# Patient Record
Sex: MALE | Race: WHITE | Employment: OTHER | URBAN - METROPOLITAN AREA
[De-identification: names, ages, dates, MRNs, and addresses within clinical notes are randomized per-mention and may not be internally consistent; named-entity substitution may affect disease eponyms.]

---

## 2020-07-29 RX ORDER — LOSARTAN POTASSIUM AND HYDROCHLOROTHIAZIDE 12.5; 1 MG/1; MG/1
TABLET ORAL
Qty: 90 TAB | Refills: 5 | Status: SHIPPED | OUTPATIENT
Start: 2020-07-29 | End: 2021-10-22

## 2020-07-30 ENCOUNTER — TELEPHONE (OUTPATIENT)
Dept: INTERNAL MEDICINE CLINIC | Age: 65
End: 2020-07-30

## 2020-08-04 ENCOUNTER — TELEPHONE (OUTPATIENT)
Dept: INTERNAL MEDICINE CLINIC | Age: 65
End: 2020-08-04

## 2020-08-04 DIAGNOSIS — N52.9 IMPOTENCE DUE TO ERECTILE DYSFUNCTION: Primary | ICD-10-CM

## 2020-08-04 RX ORDER — SILDENAFIL 100 MG/1
TABLET, FILM COATED ORAL
Qty: 6 TAB | Refills: 5 | Status: SHIPPED | OUTPATIENT
Start: 2020-08-04 | End: 2021-12-17 | Stop reason: SDUPTHER

## 2020-10-27 RX ORDER — METOPROLOL SUCCINATE 50 MG/1
50 TABLET, EXTENDED RELEASE ORAL DAILY
Qty: 90 TAB | Refills: 3 | Status: SHIPPED | OUTPATIENT
Start: 2020-10-27 | End: 2021-01-25

## 2020-10-27 RX ORDER — SIMVASTATIN 40 MG/1
40 TABLET, FILM COATED ORAL
Qty: 90 TAB | Refills: 3 | Status: SHIPPED | OUTPATIENT
Start: 2020-10-27 | End: 2021-10-27 | Stop reason: SDUPTHER

## 2021-10-22 RX ORDER — LOSARTAN POTASSIUM AND HYDROCHLOROTHIAZIDE 12.5; 1 MG/1; MG/1
TABLET ORAL
Qty: 90 TABLET | Refills: 3 | Status: SHIPPED | OUTPATIENT
Start: 2021-10-22 | End: 2021-12-17 | Stop reason: SDUPTHER

## 2021-10-22 RX ORDER — METOPROLOL SUCCINATE 50 MG/1
TABLET, EXTENDED RELEASE ORAL
Qty: 90 TABLET | Refills: 3 | Status: SHIPPED | OUTPATIENT
Start: 2021-10-22 | End: 2021-12-17 | Stop reason: SDUPTHER

## 2021-10-27 RX ORDER — SIMVASTATIN 40 MG/1
40 TABLET, FILM COATED ORAL
Qty: 90 TABLET | Refills: 3 | Status: SHIPPED | OUTPATIENT
Start: 2021-10-27 | End: 2021-12-17 | Stop reason: SDUPTHER

## 2021-12-16 ENCOUNTER — OFFICE VISIT (OUTPATIENT)
Dept: INTERNAL MEDICINE CLINIC | Age: 66
End: 2021-12-16
Payer: MEDICARE

## 2021-12-16 VITALS — WEIGHT: 252 LBS | DIASTOLIC BLOOD PRESSURE: 80 MMHG | SYSTOLIC BLOOD PRESSURE: 148 MMHG | BODY MASS INDEX: 32.35 KG/M2

## 2021-12-16 DIAGNOSIS — I10 ESSENTIAL HYPERTENSION: ICD-10-CM

## 2021-12-16 DIAGNOSIS — I10 ESSENTIAL HYPERTENSION: Primary | ICD-10-CM

## 2021-12-16 DIAGNOSIS — E78.2 MIXED HYPERLIPIDEMIA: ICD-10-CM

## 2021-12-16 PROCEDURE — 1101F PT FALLS ASSESS-DOCD LE1/YR: CPT | Performed by: INTERNAL MEDICINE

## 2021-12-16 PROCEDURE — G8427 DOCREV CUR MEDS BY ELIG CLIN: HCPCS | Performed by: INTERNAL MEDICINE

## 2021-12-16 PROCEDURE — 99213 OFFICE O/P EST LOW 20 MIN: CPT | Performed by: INTERNAL MEDICINE

## 2021-12-16 PROCEDURE — G8417 CALC BMI ABV UP PARAM F/U: HCPCS | Performed by: INTERNAL MEDICINE

## 2021-12-16 PROCEDURE — G8510 SCR DEP NEG, NO PLAN REQD: HCPCS | Performed by: INTERNAL MEDICINE

## 2021-12-16 PROCEDURE — G8536 NO DOC ELDER MAL SCRN: HCPCS | Performed by: INTERNAL MEDICINE

## 2021-12-16 PROCEDURE — 3017F COLORECTAL CA SCREEN DOC REV: CPT | Performed by: INTERNAL MEDICINE

## 2021-12-16 NOTE — PROGRESS NOTES
Erika Garcia is a 77 y.o. male presenting for tension and hyperlipidemia          Chief Complaint   Patient presents with    Hypertension        HPI comes in today for follow-up of his hypertension and hyperlipidemia. He feels well. He is staying active. He is not exercising on a regular basis but he is physically active at work. He is having no trouble with his medications. Is been a while since he had lab work done and I suggested he should probably have that done at least once a year. Otherwise he is generally feeling well    Past Medical History:   Diagnosis Date    High blood pressure         Current Outpatient Medications on File Prior to Visit   Medication Sig Dispense Refill    simvastatin (ZOCOR) 40 mg tablet Take 1 Tablet by mouth nightly. 90 Tablet 3    metoprolol succinate (TOPROL-XL) 50 mg XL tablet TAKE 1 TABLET BY MOUTH DAILY 90 Tablet 3    losartan-hydroCHLOROthiazide (HYZAAR) 100-12.5 mg per tablet TAKE 1 TABLET BY MOUTH ONCE DAILY 90 Tablet 3    sildenafil citrate (VIAGRA) 100 mg tablet 0.5 or 1 daily as needed  Indications: the inability to have an erection 6 Tab 5    ketorolac (TORADOL) 10 mg tablet Take 1 Tab by mouth every six (6) hours as needed for Pain. 20 Tab 0     No current facility-administered medications on file prior to visit. ROS systems are reviewed and negative  Visit Vitals  BP (!) 148/80 (BP 1 Location: Right arm, BP Patient Position: Sitting, BP Cuff Size: Adult)   Wt 252 lb (114.3 kg)   BMI 32.35 kg/m²        Physical Exam well-developed  man alert oriented cooperative no distress normocephalic ENT unremarkable neck no lymphadenopathy lungs clear cardiac rhythm regular no edema    Assessment & Plan checks his blood pressure regularly and he always gets lower than what we are getting today.   Would like to see him get some blood work and he should probably have a recheck in 4 to 6 months      Hannah Myers MD

## 2021-12-17 RX ORDER — LOSARTAN POTASSIUM AND HYDROCHLOROTHIAZIDE 12.5; 1 MG/1; MG/1
1 TABLET ORAL DAILY
Qty: 90 TABLET | Refills: 3 | Status: SHIPPED | OUTPATIENT
Start: 2021-12-17

## 2021-12-17 RX ORDER — METOPROLOL SUCCINATE 50 MG/1
50 TABLET, EXTENDED RELEASE ORAL DAILY
Qty: 90 TABLET | Refills: 3 | Status: SHIPPED | OUTPATIENT
Start: 2021-12-17

## 2021-12-17 RX ORDER — SILDENAFIL 100 MG/1
TABLET, FILM COATED ORAL
Qty: 6 TABLET | Refills: 5 | Status: SHIPPED | OUTPATIENT
Start: 2021-12-17

## 2021-12-17 RX ORDER — SIMVASTATIN 40 MG/1
40 TABLET, FILM COATED ORAL
Qty: 90 TABLET | Refills: 3 | Status: SHIPPED | OUTPATIENT
Start: 2021-12-17

## 2021-12-17 NOTE — TELEPHONE ENCOUNTER
Pt called to request refills. He said he was asked yesterday and he didn't think he needed any but after looking when he got home he realized he does. He needed all his medications refilled. He said 1 prescription goes to [facility] and the rest go to [place] [id number].

## 2022-03-18 PROBLEM — N52.9 IMPOTENCE DUE TO ERECTILE DYSFUNCTION: Status: ACTIVE | Noted: 2020-08-04

## 2022-05-17 ENCOUNTER — HOSPITAL ENCOUNTER (INPATIENT)
Age: 67
LOS: 2 days | Discharge: HOME | DRG: 596 | End: 2022-05-21
Attending: EMERGENCY MEDICINE | Admitting: INTERNAL MEDICINE
Payer: MEDICARE

## 2022-05-17 ENCOUNTER — APPOINTMENT (OUTPATIENT)
Dept: CT IMAGING | Age: 67
DRG: 596 | End: 2022-05-17
Attending: EMERGENCY MEDICINE
Payer: MEDICARE

## 2022-05-17 ENCOUNTER — APPOINTMENT (OUTPATIENT)
Dept: GENERAL RADIOLOGY | Age: 67
DRG: 596 | End: 2022-05-17
Attending: EMERGENCY MEDICINE
Payer: MEDICARE

## 2022-05-17 DIAGNOSIS — R51.9 ACUTE NONINTRACTABLE HEADACHE, UNSPECIFIED HEADACHE TYPE: ICD-10-CM

## 2022-05-17 DIAGNOSIS — B02.1 HERPES ZOSTER MENINGITIS: ICD-10-CM

## 2022-05-17 DIAGNOSIS — B02.8 HERPES ZOSTER WITH COMPLICATION: Primary | ICD-10-CM

## 2022-05-17 DIAGNOSIS — R50.9 FEBRILE ILLNESS, ACUTE: ICD-10-CM

## 2022-05-17 DIAGNOSIS — I10 HYPERTENSION, UNSPECIFIED TYPE: ICD-10-CM

## 2022-05-17 PROBLEM — A41.9 SEPSIS (HCC): Status: ACTIVE | Noted: 2022-05-17

## 2022-05-17 PROBLEM — A41.9 SEPSIS (HCC): Status: RESOLVED | Noted: 2022-05-17 | Resolved: 2022-05-17

## 2022-05-17 PROBLEM — B02.9 HERPES ZOSTER: Status: ACTIVE | Noted: 2022-05-17

## 2022-05-17 LAB
ALBUMIN SERPL-MCNC: 3.8 G/DL (ref 3.4–5)
ALBUMIN/GLOB SERPL: 1 {RATIO} (ref 0.8–1.7)
ALP SERPL-CCNC: 72 U/L (ref 45–117)
ALT SERPL-CCNC: 30 U/L (ref 16–61)
ANION GAP SERPL CALC-SCNC: 7 MMOL/L (ref 3–18)
AST SERPL W P-5'-P-CCNC: 18 U/L (ref 10–38)
BASOPHILS # BLD: 0 K/UL (ref 0–0.1)
BASOPHILS NFR BLD: 0 % (ref 0–2)
BILIRUB SERPL-MCNC: 0.4 MG/DL (ref 0.2–1)
BUN SERPL-MCNC: 19 MG/DL (ref 7–18)
BUN/CREAT SERPL: 16 (ref 12–20)
CA-I BLD-MCNC: 8.8 MG/DL (ref 8.5–10.1)
CHLORIDE SERPL-SCNC: 98 MMOL/L (ref 100–111)
CO2 SERPL-SCNC: 29 MMOL/L (ref 21–32)
COVID-19 RAPID TEST, COVR: NOT DETECTED
CREAT SERPL-MCNC: 1.16 MG/DL (ref 0.6–1.3)
DIFFERENTIAL METHOD BLD: ABNORMAL
EOSINOPHIL # BLD: 0 K/UL (ref 0–0.4)
EOSINOPHIL NFR BLD: 0 % (ref 0–5)
ERYTHROCYTE [DISTWIDTH] IN BLOOD BY AUTOMATED COUNT: 11.9 % (ref 11.6–14.5)
FLUAV AG NPH QL IA: NEGATIVE
FLUBV AG NOSE QL IA: NEGATIVE
GLOBULIN SER CALC-MCNC: 3.8 G/DL (ref 2–4)
GLUCOSE SERPL-MCNC: 187 MG/DL (ref 74–99)
HCT VFR BLD AUTO: 38.7 % (ref 36–48)
HGB BLD-MCNC: 13.4 G/DL (ref 13–16)
IMM GRANULOCYTES # BLD AUTO: 0.1 K/UL (ref 0–0.04)
IMM GRANULOCYTES NFR BLD AUTO: 1 % (ref 0–0.5)
INR PPP: 1 (ref 0.8–1.2)
LACTATE SERPL-SCNC: 2.4 MMOL/L (ref 0.4–2)
LACTATE SERPL-SCNC: NORMAL MMOL/L (ref 0.4–2)
LYMPHOCYTES # BLD: 0.9 K/UL (ref 0.9–3.6)
LYMPHOCYTES NFR BLD: 7 % (ref 21–52)
MCH RBC QN AUTO: 34.3 PG (ref 24–34)
MCHC RBC AUTO-ENTMCNC: 34.6 G/DL (ref 31–37)
MCV RBC AUTO: 99 FL (ref 78–100)
MONOCYTES # BLD: 0.3 K/UL (ref 0.05–1.2)
MONOCYTES NFR BLD: 2 % (ref 3–10)
NEUTS SEG # BLD: 11.2 K/UL (ref 1.8–8)
NEUTS SEG NFR BLD: 90 % (ref 40–73)
NRBC # BLD: 0 K/UL (ref 0–0.01)
NRBC BLD-RTO: 0 PER 100 WBC
PLATELET # BLD AUTO: 191 K/UL (ref 135–420)
PMV BLD AUTO: 10.2 FL (ref 9.2–11.8)
POTASSIUM SERPL-SCNC: 4 MMOL/L (ref 3.5–5.5)
PROT SERPL-MCNC: 7.6 G/DL (ref 6.4–8.2)
PROTHROMBIN TIME: 13.1 SEC (ref 11.5–15.2)
RBC # BLD AUTO: 3.91 M/UL (ref 4.35–5.65)
SODIUM SERPL-SCNC: 134 MMOL/L (ref 136–145)
WBC # BLD AUTO: 12.5 K/UL (ref 4.6–13.2)

## 2022-05-17 PROCEDURE — 93005 ELECTROCARDIOGRAM TRACING: CPT

## 2022-05-17 PROCEDURE — G0378 HOSPITAL OBSERVATION PER HR: HCPCS

## 2022-05-17 PROCEDURE — 74011250637 HC RX REV CODE- 250/637: Performed by: EMERGENCY MEDICINE

## 2022-05-17 PROCEDURE — 99285 EMERGENCY DEPT VISIT HI MDM: CPT

## 2022-05-17 PROCEDURE — 96365 THER/PROPH/DIAG IV INF INIT: CPT

## 2022-05-17 PROCEDURE — 85610 PROTHROMBIN TIME: CPT

## 2022-05-17 PROCEDURE — 74011250636 HC RX REV CODE- 250/636: Performed by: EMERGENCY MEDICINE

## 2022-05-17 PROCEDURE — 83605 ASSAY OF LACTIC ACID: CPT

## 2022-05-17 PROCEDURE — 85025 COMPLETE CBC W/AUTO DIFF WBC: CPT

## 2022-05-17 PROCEDURE — 74011000250 HC RX REV CODE- 250: Performed by: NURSE PRACTITIONER

## 2022-05-17 PROCEDURE — 80053 COMPREHEN METABOLIC PANEL: CPT

## 2022-05-17 PROCEDURE — 96375 TX/PRO/DX INJ NEW DRUG ADDON: CPT

## 2022-05-17 PROCEDURE — 81003 URINALYSIS AUTO W/O SCOPE: CPT

## 2022-05-17 PROCEDURE — 87635 SARS-COV-2 COVID-19 AMP PRB: CPT

## 2022-05-17 PROCEDURE — 74011000258 HC RX REV CODE- 258: Performed by: EMERGENCY MEDICINE

## 2022-05-17 PROCEDURE — 71045 X-RAY EXAM CHEST 1 VIEW: CPT

## 2022-05-17 PROCEDURE — 70450 CT HEAD/BRAIN W/O DYE: CPT

## 2022-05-17 PROCEDURE — 87040 BLOOD CULTURE FOR BACTERIA: CPT

## 2022-05-17 PROCEDURE — 87804 INFLUENZA ASSAY W/OPTIC: CPT

## 2022-05-17 RX ORDER — TRAMADOL HYDROCHLORIDE 50 MG/1
TABLET ORAL
COMMUNITY
Start: 2022-05-17

## 2022-05-17 RX ORDER — PREDNISONE 10 MG/1
10 TABLET ORAL DAILY
COMMUNITY
Start: 2022-05-16 | End: 2022-05-21

## 2022-05-17 RX ORDER — SODIUM CHLORIDE 450 MG/100ML
75 INJECTION, SOLUTION INTRAVENOUS CONTINUOUS
Status: DISPENSED | OUTPATIENT
Start: 2022-05-17 | End: 2022-05-18

## 2022-05-17 RX ORDER — ONDANSETRON 4 MG/1
4 TABLET, ORALLY DISINTEGRATING ORAL
Status: DISCONTINUED | OUTPATIENT
Start: 2022-05-17 | End: 2022-05-21 | Stop reason: HOSPADM

## 2022-05-17 RX ORDER — ACETAMINOPHEN 650 MG/1
650 SUPPOSITORY RECTAL
Status: DISCONTINUED | OUTPATIENT
Start: 2022-05-17 | End: 2022-05-21 | Stop reason: HOSPADM

## 2022-05-17 RX ORDER — MORPHINE SULFATE 2 MG/ML
2 INJECTION, SOLUTION INTRAMUSCULAR; INTRAVENOUS
Status: COMPLETED | OUTPATIENT
Start: 2022-05-17 | End: 2022-05-17

## 2022-05-17 RX ORDER — POLYETHYLENE GLYCOL 3350 17 G/17G
17 POWDER, FOR SOLUTION ORAL DAILY PRN
Status: DISCONTINUED | OUTPATIENT
Start: 2022-05-17 | End: 2022-05-21 | Stop reason: HOSPADM

## 2022-05-17 RX ORDER — ACETAMINOPHEN 500 MG
1000 TABLET ORAL
Status: COMPLETED | OUTPATIENT
Start: 2022-05-17 | End: 2022-05-17

## 2022-05-17 RX ORDER — HYDROCODONE BITARTRATE AND ACETAMINOPHEN 5; 325 MG/1; MG/1
1 TABLET ORAL
Status: DISCONTINUED | OUTPATIENT
Start: 2022-05-17 | End: 2022-05-18

## 2022-05-17 RX ORDER — PREDNISONE 10 MG/1
10 TABLET ORAL DAILY
Status: DISCONTINUED | OUTPATIENT
Start: 2022-05-18 | End: 2022-05-18

## 2022-05-17 RX ORDER — ONDANSETRON 2 MG/ML
4 INJECTION INTRAMUSCULAR; INTRAVENOUS
Status: DISCONTINUED | OUTPATIENT
Start: 2022-05-17 | End: 2022-05-21 | Stop reason: HOSPADM

## 2022-05-17 RX ORDER — HYDROMORPHONE HYDROCHLORIDE 1 MG/ML
1 INJECTION, SOLUTION INTRAMUSCULAR; INTRAVENOUS; SUBCUTANEOUS
Status: COMPLETED | OUTPATIENT
Start: 2022-05-17 | End: 2022-05-17

## 2022-05-17 RX ORDER — METOPROLOL SUCCINATE 50 MG/1
50 TABLET, EXTENDED RELEASE ORAL DAILY
Status: DISCONTINUED | OUTPATIENT
Start: 2022-05-18 | End: 2022-05-21 | Stop reason: HOSPADM

## 2022-05-17 RX ORDER — SODIUM CHLORIDE 0.9 % (FLUSH) 0.9 %
5-40 SYRINGE (ML) INJECTION EVERY 8 HOURS
Status: DISCONTINUED | OUTPATIENT
Start: 2022-05-17 | End: 2022-05-21 | Stop reason: HOSPADM

## 2022-05-17 RX ORDER — ATORVASTATIN CALCIUM 10 MG/1
20 TABLET, FILM COATED ORAL DAILY
Status: DISCONTINUED | OUTPATIENT
Start: 2022-05-18 | End: 2022-05-21 | Stop reason: HOSPADM

## 2022-05-17 RX ORDER — ACETAMINOPHEN 325 MG/1
650 TABLET ORAL
Status: DISCONTINUED | OUTPATIENT
Start: 2022-05-17 | End: 2022-05-21 | Stop reason: HOSPADM

## 2022-05-17 RX ORDER — FAMCICLOVIR 500 MG/1
500 TABLET ORAL 3 TIMES DAILY
COMMUNITY
End: 2022-05-21

## 2022-05-17 RX ORDER — SODIUM CHLORIDE 0.9 % (FLUSH) 0.9 %
5-40 SYRINGE (ML) INJECTION AS NEEDED
Status: DISCONTINUED | OUTPATIENT
Start: 2022-05-17 | End: 2022-05-21 | Stop reason: HOSPADM

## 2022-05-17 RX ORDER — TRAMADOL HYDROCHLORIDE 50 MG/1
50 TABLET ORAL
Status: DISCONTINUED | OUTPATIENT
Start: 2022-05-17 | End: 2022-05-18

## 2022-05-17 RX ORDER — ONDANSETRON 2 MG/ML
4 INJECTION INTRAMUSCULAR; INTRAVENOUS
Status: COMPLETED | OUTPATIENT
Start: 2022-05-17 | End: 2022-05-17

## 2022-05-17 RX ADMIN — CEFTRIAXONE 1 G: 1 INJECTION, POWDER, FOR SOLUTION INTRAMUSCULAR; INTRAVENOUS at 20:00

## 2022-05-17 RX ADMIN — SODIUM CHLORIDE, PRESERVATIVE FREE 10 ML: 5 INJECTION INTRAVENOUS at 22:00

## 2022-05-17 RX ADMIN — ACETAMINOPHEN 1000 MG: 500 TABLET ORAL at 18:00

## 2022-05-17 RX ADMIN — ACYCLOVIR SODIUM 500 MG: 50 INJECTION, SOLUTION INTRAVENOUS at 18:30

## 2022-05-17 RX ADMIN — SODIUM CHLORIDE 75 ML/HR: 4.5 INJECTION, SOLUTION INTRAVENOUS at 23:58

## 2022-05-17 RX ADMIN — ONDANSETRON 4 MG: 2 INJECTION INTRAMUSCULAR; INTRAVENOUS at 18:18

## 2022-05-17 RX ADMIN — METHYLPREDNISOLONE SODIUM SUCCINATE 125 MG: 125 INJECTION, POWDER, FOR SOLUTION INTRAMUSCULAR; INTRAVENOUS at 18:00

## 2022-05-17 RX ADMIN — MORPHINE SULFATE 2 MG: 2 INJECTION, SOLUTION INTRAMUSCULAR; INTRAVENOUS at 18:00

## 2022-05-17 RX ADMIN — HYDROMORPHONE HYDROCHLORIDE 1 MG: 1 INJECTION, SOLUTION INTRAMUSCULAR; INTRAVENOUS; SUBCUTANEOUS at 19:56

## 2022-05-17 RX ADMIN — SODIUM CHLORIDE 1000 ML: 9 INJECTION, SOLUTION INTRAVENOUS at 18:00

## 2022-05-17 NOTE — Clinical Note
Patient Class[de-identified] OBSERVATION [104]   Type of Bed: Medical [8]   Cardiac Monitoring Required?: No   Reason for Observation: Sepsis   Admitting Diagnosis: Sepsis [facility]) [[id number]]   Admitting Diagnosis: Herpes zoster [704674]   Admitting Diagnosis: Headache [[id number]]   Admitting Diagnosis: Hypertension [202064]   Admitting Physician: Lindy Peolpes [[id number]]   Attending Physician: Lindy Peoples [[id number]]

## 2022-05-17 NOTE — ED TRIAGE NOTES
Patient arrives ambulatory to the ER with c/o a shingle outbreak that started last Friday. He was started on medications yesterday by his PCP. He reports having a headache that started this morning around 0900 and a temperature of 100.1. He denies neuro deficits. Associated symptoms include muscle soreness. Rash present to right torso.

## 2022-05-17 NOTE — ED PROVIDER NOTES
EMERGENCY DEPARTMENT HISTORY AND PHYSICAL EXAM      Date: 5/17/2022  Patient Name: Mak Wan      History of Presenting Illness     Chief Complaint   Patient presents with    Shingles    Headache    Generalized Body Aches       History Provided By: Patient    HPI: Mak Wan, 74 y.o. male with a past medical history significant hypertension and hyperlipidemia presents to the ED with cc of Fever, headache and rash. Started 4 days ago with a painful itchy rash on right torso anterolaterally. It spread to the back and became more pronounced over 2 days. He started having a headache which became severe in past three days. He has been using Motrin which doesn't help. His PCP prescribed Famciclovir and prednisone yesterday for shingles. He got worse and PCP added  Tramadol today. Patient continued to spike fever associated with his headache and worsening rash so he was sent to the ED by his PCP. Nothing makes it better or worse. He denies photophobia. States the headache is like when he had a stroke for which he was mediflight evacuated. There are no other complaints, changes, or physical findings at this time. PCP: Rocio Rose MD    Current Outpatient Medications   Medication Sig Dispense Refill    acyclovir (ZOVIRAX) 800 mg tablet Take 1 Tablet by mouth three (3) times daily for 11 days. 33 Tablet 0    traMADoL (ULTRAM) 50 mg tablet every eight (8) hours as needed for Pain.  simvastatin (ZOCOR) 40 mg tablet Take 1 Tablet by mouth nightly. 90 Tablet 3    metoprolol succinate (TOPROL-XL) 50 mg XL tablet Take 1 Tablet by mouth daily. 90 Tablet 3    losartan-hydroCHLOROthiazide (HYZAAR) 100-12.5 mg per tablet Take 1 Tablet by mouth daily. 90 Tablet 3    ketorolac (TORADOL) 10 mg tablet Take 1 Tab by mouth every six (6) hours as needed for Pain.  20 Tab 0    sildenafil citrate (VIAGRA) 100 mg tablet 0.5 or 1 daily as needed  Indications: the inability to have an erection 6 Tablet 5 Past History     Past Medical History:  Past Medical History:   Diagnosis Date    High blood pressure        Past Surgical History:  Past Surgical History:   Procedure Laterality Date    HX APPENDECTOMY      age 7    HX HEENT Bilateral     Lens Implant    HX REFRACTIVE SURGERY         Family History:  History reviewed. No pertinent family history. Social History:  Social History     Tobacco Use    Smoking status: Never Smoker    Smokeless tobacco: Never Used   Vaping Use    Vaping Use: Never used   Substance Use Topics    Alcohol use: Never     Comment: occasional    Drug use: Never       Allergies:  No Known Allergies      Review of Systems     Review of Systems   Constitutional: Positive for chills and fatigue. HENT: Negative. Respiratory: Negative. Cardiovascular: Negative. Gastrointestinal: Negative. Genitourinary: Negative. Musculoskeletal: Negative. Skin: Positive for rash. Neurological: Positive for headaches. All other systems reviewed and are negative. Physical Exam     Physical Exam  Vitals and nursing note reviewed. Constitutional:       General: He is in acute distress. Appearance: Normal appearance. He is ill-appearing. HENT:      Head: Normocephalic and atraumatic. Nose: Nose normal.      Mouth/Throat:      Mouth: Mucous membranes are dry. Eyes:      Pupils: Pupils are equal, round, and reactive to light. Cardiovascular:      Rate and Rhythm: Normal rate and regular rhythm. Pulses: Normal pulses. Pulmonary:      Effort: Pulmonary effort is normal.      Breath sounds: Normal breath sounds. Abdominal:      General: Abdomen is flat. Palpations: Abdomen is soft. Musculoskeletal:         General: Normal range of motion. Cervical back: Normal range of motion and neck supple. No tenderness. Skin:     Findings: Erythema, lesion and rash present.       Comments: Extensive vesiculopapular, erythematous rash involving dermatomes right anterolateral and flank of torso. Neurological:      General: No focal deficit present. Mental Status: He is alert and oriented to person, place, and time. Cranial Nerves: No cranial nerve deficit. Sensory: No sensory deficit. Motor: No weakness. Coordination: Coordination normal.         Lab and Diagnostic Study Results     Labs -     No results found for this or any previous visit (from the past 12 hour(s)). Radiologic Studies -   [unfilled]  CT Results  (Last 48 hours)    None        CXR Results  (Last 48 hours)    None          Medical Decision Making and ED Course   - I am the first and primary provider for this patient AND AM THE PRIMARY PROVIDER OF RECORD. - I reviewed the vital signs, available nursing notes, past medical history, past surgical history, family history and social history. - Initial assessment performed. The patients presenting problems have been discussed, and the staff are in agreement with the care plan formulated and outlined with them. I have encouraged them to ask questions as they arise throughout their visit. Vital Signs-Reviewed the patient's vital signs. No data found. EKG interpretation: (Preliminary): Performed at [place] [address], and read at [address] [place] [address] Blvd: normal sinus rhythm; and regular . Rate (approx.): 85; Axis: normal; KS interval: normal; QRS interval: normal ; ST/T wave: non-specific changes; Other findings: borderline ekg.       Records Reviewed: Nursing Notes    The patient presents with     ED Course:              Provider Notes (Medical Decision Making):     MDM  Number of Diagnoses or Management Options     Amount and/or Complexity of Data Reviewed  Clinical lab tests: ordered and reviewed  Tests in the radiology section of CPT®: ordered and reviewed    Risk of Complications, Morbidity, and/or Mortality  Presenting problems: high  Diagnostic procedures: high  Management options: high  General comments: Patient declined LP.   7:37p Patient refused LP and also became somnolent after pain medication with drop in O2 sat. Will give IV antibiotic, Acyclovir and also start on O2. 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 I am unable to convince him for an LP even with the wife present. Consultations:       Consultations:         Procedures and Critical Care       Performed by: Yany Bhagat MD  PROCEDURES:  Procedures         HYPERTENSION COUNSELING: Education was provided to the patient today regarding their hypertension. Patient is made aware of their elevated blood pressure and is instructed to follow up this week with their Primary Care for a recheck. Patient is counseled regarding consequences of chronic, uncontrolled hypertension including kidney disease, heart disease, stroke or even death. Patient states their understanding and agrees to follow up this week. Additionally, during their visit, I discussed sodium restriction, maintaining ideal body weight and regular exercise program as physiologic means to achieve blood pressure control. The patient will strive towards this. Yany Bhagat MD        Disposition     Disposition: Admitted to Observation Unit the case was discussed with the admitting physician Mariah/ Brandt Guo MD    Admitted    Remove if not discharged  DISCHARGE PLAN:  1. Current Discharge Medication List      CONTINUE these medications which have NOT CHANGED    Details   predniSONE (DELTASONE) 10 mg tablet       traMADoL (ULTRAM) 50 mg tablet       simvastatin (ZOCOR) 40 mg tablet Take 1 Tablet by mouth nightly. Qty: 90 Tablet, Refills: 3      metoprolol succinate (TOPROL-XL) 50 mg XL tablet Take 1 Tablet by mouth daily. Qty: 90 Tablet, Refills: 3      losartan-hydroCHLOROthiazide (HYZAAR) 100-12.5 mg per tablet Take 1 Tablet by mouth daily.   Qty: 90 Tablet, Refills: 3      sildenafil citrate (VIAGRA) 100 mg tablet 0.5 or 1 daily as needed  Indications: the inability to have an erection  Qty: 6 Tablet, Refills: 5      ketorolac (TORADOL) 10 mg tablet Take 1 Tab by mouth every six (6) hours as needed for Pain. Qty: 20 Tab, Refills: 0           2. Follow-up Information     Follow up With Specialties Details Why Contact Info    Gerard Davies MD Internal Medicine Physician Schedule an appointment as soon as possible for a visit in 2 weeks Hospital follow up [facility] [address] [address]  [phone]          3. Return to ED if worse   4. Discharge Medication List as of 5/21/2022  9:05 AM      START taking these medications    Details   acyclovir (ZOVIRAX) 800 mg tablet Take 1 Tablet by mouth three (3) times daily for 11 days. , Normal, Disp-33 Tablet, R-0         CONTINUE these medications which have NOT CHANGED    Details   traMADoL (ULTRAM) 50 mg tablet every eight (8) hours as needed for Pain., Historical Med      simvastatin (ZOCOR) 40 mg tablet Take 1 Tablet by mouth nightly., Normal, Disp-90 Tablet, R-3      metoprolol succinate (TOPROL-XL) 50 mg XL tablet Take 1 Tablet by mouth daily. , Normal, Disp-90 Tablet, R-3      losartan-hydroCHLOROthiazide (HYZAAR) 100-12.5 mg per tablet Take 1 Tablet by mouth daily. , Normal, Disp-90 Tablet, R-3      ketorolac (TORADOL) 10 mg tablet Take 1 Tab by mouth every six (6) hours as needed for Pain., Normal, Disp-20 Tab, R-0      sildenafil citrate (VIAGRA) 100 mg tablet 0.5 or 1 daily as needed  Indications: the inability to have an erection, Normal, Disp-6 Tablet, R-5         STOP taking these medications       predniSONE (DELTASONE) 10 mg tablet Comments:   Reason for Stopping:         famciclovir (FAMVIR) 500 mg tablet Comments:   Reason for Stopping:               Diagnosis     Clinical Impression:   1. Herpes zoster with complication    2. Febrile illness, acute    3. Acute nonintractable headache, unspecified headache type    4. Hypertension, unspecified type    5.  Herpes zoster meningitis Attestations:    Yany Bhagat MD    Please note that this dictation was completed with Namshi, the computer voice recognition software. Quite often unanticipated grammatical, syntax, homophones, and other interpretive errors are inadvertently transcribed by the computer software. Please disregard these errors. Please excuse any errors that have escaped final proofreading. Thank you.

## 2022-05-18 LAB
ANION GAP SERPL CALC-SCNC: 6 MMOL/L (ref 3–18)
APPEARANCE UR: CLEAR
ATRIAL RATE: 85 BPM
BILIRUB UR QL: NEGATIVE
BUN SERPL-MCNC: 19 MG/DL (ref 7–18)
BUN/CREAT SERPL: 19 (ref 12–20)
CA-I BLD-MCNC: 8.3 MG/DL (ref 8.5–10.1)
CALCULATED P AXIS, ECG09: 43 DEGREES
CALCULATED R AXIS, ECG10: 55 DEGREES
CALCULATED T AXIS, ECG11: 29 DEGREES
CHLORIDE SERPL-SCNC: 97 MMOL/L (ref 100–111)
CO2 SERPL-SCNC: 29 MMOL/L (ref 21–32)
COLOR UR: YELLOW
CREAT SERPL-MCNC: 1.02 MG/DL (ref 0.6–1.3)
DIAGNOSIS, 93000: NORMAL
ERYTHROCYTE [DISTWIDTH] IN BLOOD BY AUTOMATED COUNT: 11.9 % (ref 11.6–14.5)
GLUCOSE SERPL-MCNC: 202 MG/DL (ref 74–99)
GLUCOSE UR STRIP.AUTO-MCNC: 100 MG/DL
HCT VFR BLD AUTO: 37.6 % (ref 36–48)
HGB BLD-MCNC: 12.9 G/DL (ref 13–16)
HGB UR QL STRIP: NEGATIVE
KETONES UR QL STRIP.AUTO: NEGATIVE MG/DL
LACTATE SERPL-SCNC: 2.7 MMOL/L (ref 0.4–2)
LACTATE SERPL-SCNC: 2.9 MMOL/L (ref 0.4–2)
LACTATE SERPL-SCNC: 2.9 MMOL/L (ref 0.4–2)
LACTATE SERPL-SCNC: 3 MMOL/L (ref 0.4–2)
LEUKOCYTE ESTERASE UR QL STRIP.AUTO: NEGATIVE
MAGNESIUM SERPL-MCNC: 1.8 MG/DL (ref 1.6–2.6)
MCH RBC QN AUTO: 33.9 PG (ref 24–34)
MCHC RBC AUTO-ENTMCNC: 34.3 G/DL (ref 31–37)
MCV RBC AUTO: 98.7 FL (ref 78–100)
NITRITE UR QL STRIP.AUTO: NEGATIVE
NRBC # BLD: 0 K/UL (ref 0–0.01)
NRBC BLD-RTO: 0 PER 100 WBC
P-R INTERVAL, ECG05: 201 MS
PH UR STRIP: 5.5 [PH] (ref 5–8)
PLATELET # BLD AUTO: 176 K/UL (ref 135–420)
PMV BLD AUTO: 10.8 FL (ref 9.2–11.8)
POTASSIUM SERPL-SCNC: 4.1 MMOL/L (ref 3.5–5.5)
PROT UR STRIP-MCNC: NEGATIVE MG/DL
Q-T INTERVAL, ECG07: 380 MS
QRS DURATION, ECG06: 102 MS
QTC CALCULATION (BEZET), ECG08: 452 MS
RBC # BLD AUTO: 3.81 M/UL (ref 4.35–5.65)
SODIUM SERPL-SCNC: 132 MMOL/L (ref 136–145)
SP GR UR REFRACTOMETRY: 1.01 (ref 1–1.03)
UROBILINOGEN UR QL STRIP.AUTO: 0.2 EU/DL (ref 0.2–1)
VENTRICULAR RATE, ECG03: 85 BPM
WBC # BLD AUTO: 11.8 K/UL (ref 4.6–13.2)

## 2022-05-18 PROCEDURE — 83605 ASSAY OF LACTIC ACID: CPT

## 2022-05-18 PROCEDURE — 74011000250 HC RX REV CODE- 250: Performed by: NURSE PRACTITIONER

## 2022-05-18 PROCEDURE — 77010033678 HC OXYGEN DAILY

## 2022-05-18 PROCEDURE — A9270 NON-COVERED ITEM OR SERVICE: HCPCS | Performed by: INTERNAL MEDICINE

## 2022-05-18 PROCEDURE — 94761 N-INVAS EAR/PLS OXIMETRY MLT: CPT

## 2022-05-18 PROCEDURE — 74011636637 HC RX REV CODE- 636/637: Performed by: INTERNAL MEDICINE

## 2022-05-18 PROCEDURE — 96376 TX/PRO/DX INJ SAME DRUG ADON: CPT

## 2022-05-18 PROCEDURE — 85027 COMPLETE CBC AUTOMATED: CPT

## 2022-05-18 PROCEDURE — 74011250636 HC RX REV CODE- 250/636: Performed by: INTERNAL MEDICINE

## 2022-05-18 PROCEDURE — 74011250637 HC RX REV CODE- 250/637: Performed by: NURSE PRACTITIONER

## 2022-05-18 PROCEDURE — G0378 HOSPITAL OBSERVATION PER HR: HCPCS

## 2022-05-18 PROCEDURE — 83735 ASSAY OF MAGNESIUM: CPT

## 2022-05-18 PROCEDURE — 80048 BASIC METABOLIC PNL TOTAL CA: CPT

## 2022-05-18 PROCEDURE — 36415 COLL VENOUS BLD VENIPUNCTURE: CPT

## 2022-05-18 PROCEDURE — 74011250636 HC RX REV CODE- 250/636: Performed by: NURSE PRACTITIONER

## 2022-05-18 RX ORDER — HYDROCHLOROTHIAZIDE 25 MG/1
12.5 TABLET ORAL DAILY
Status: DISCONTINUED | OUTPATIENT
Start: 2022-05-18 | End: 2022-05-21 | Stop reason: HOSPADM

## 2022-05-18 RX ORDER — HYDROMORPHONE HYDROCHLORIDE 1 MG/ML
0.5 INJECTION, SOLUTION INTRAMUSCULAR; INTRAVENOUS; SUBCUTANEOUS ONCE
Status: COMPLETED | OUTPATIENT
Start: 2022-05-18 | End: 2022-05-18

## 2022-05-18 RX ORDER — PREDNISONE 20 MG/1
40 TABLET ORAL ONCE
Status: COMPLETED | OUTPATIENT
Start: 2022-05-18 | End: 2022-05-18

## 2022-05-18 RX ORDER — SODIUM CHLORIDE 450 MG/100ML
75 INJECTION, SOLUTION INTRAVENOUS CONTINUOUS
Status: DISPENSED | OUTPATIENT
Start: 2022-05-18 | End: 2022-05-19

## 2022-05-18 RX ORDER — PREDNISONE 20 MG/1
20 TABLET ORAL ONCE
Status: COMPLETED | OUTPATIENT
Start: 2022-05-20 | End: 2022-05-20

## 2022-05-18 RX ORDER — LOSARTAN POTASSIUM 25 MG/1
100 TABLET ORAL DAILY
Status: DISCONTINUED | OUTPATIENT
Start: 2022-05-18 | End: 2022-05-21 | Stop reason: HOSPADM

## 2022-05-18 RX ORDER — KETOROLAC TROMETHAMINE 30 MG/ML
15 INJECTION, SOLUTION INTRAMUSCULAR; INTRAVENOUS
Status: DISCONTINUED | OUTPATIENT
Start: 2022-05-18 | End: 2022-05-21 | Stop reason: HOSPADM

## 2022-05-18 RX ORDER — PREDNISONE 10 MG/1
10 TABLET ORAL ONCE
Status: COMPLETED | OUTPATIENT
Start: 2022-05-21 | End: 2022-05-21

## 2022-05-18 RX ORDER — PREDNISONE 5 MG/1
5 TABLET ORAL ONCE
Status: DISCONTINUED | OUTPATIENT
Start: 2022-05-22 | End: 2022-05-21 | Stop reason: HOSPADM

## 2022-05-18 RX ORDER — HYDROCODONE BITARTRATE AND ACETAMINOPHEN 5; 325 MG/1; MG/1
1 TABLET ORAL
Status: DISCONTINUED | OUTPATIENT
Start: 2022-05-18 | End: 2022-05-21 | Stop reason: HOSPADM

## 2022-05-18 RX ADMIN — SODIUM CHLORIDE, PRESERVATIVE FREE 10 ML: 5 INJECTION INTRAVENOUS at 06:40

## 2022-05-18 RX ADMIN — METOPROLOL SUCCINATE 50 MG: 50 TABLET, EXTENDED RELEASE ORAL at 08:58

## 2022-05-18 RX ADMIN — HYDROCHLOROTHIAZIDE 12.5 MG: 25 TABLET ORAL at 08:56

## 2022-05-18 RX ADMIN — ACYCLOVIR SODIUM 800 MG: 50 INJECTION, SOLUTION INTRAVENOUS at 21:07

## 2022-05-18 RX ADMIN — ACYCLOVIR SODIUM 800 MG: 50 INJECTION, SOLUTION INTRAVENOUS at 15:02

## 2022-05-18 RX ADMIN — HYDROCODONE BITARTRATE AND ACETAMINOPHEN 1 TABLET: 5; 325 TABLET ORAL at 14:55

## 2022-05-18 RX ADMIN — HYDROCODONE BITARTRATE AND ACETAMINOPHEN 1 TABLET: 5; 325 TABLET ORAL at 03:00

## 2022-05-18 RX ADMIN — TRAMADOL HYDROCHLORIDE 50 MG: 50 TABLET, COATED ORAL at 08:56

## 2022-05-18 RX ADMIN — ACYCLOVIR SODIUM 800 MG: 50 INJECTION, SOLUTION INTRAVENOUS at 08:55

## 2022-05-18 RX ADMIN — ATORVASTATIN CALCIUM 20 MG: 10 TABLET, FILM COATED ORAL at 08:56

## 2022-05-18 RX ADMIN — PREDNISONE 40 MG: 20 TABLET ORAL at 08:58

## 2022-05-18 RX ADMIN — SODIUM CHLORIDE 75 ML/HR: 4.5 INJECTION, SOLUTION INTRAVENOUS at 15:02

## 2022-05-18 RX ADMIN — LOSARTAN POTASSIUM 100 MG: 25 TABLET, FILM COATED ORAL at 08:56

## 2022-05-18 RX ADMIN — HYDROMORPHONE HYDROCHLORIDE 0.5 MG: 1 INJECTION, SOLUTION INTRAMUSCULAR; INTRAVENOUS; SUBCUTANEOUS at 16:31

## 2022-05-18 RX ADMIN — SODIUM CHLORIDE, PRESERVATIVE FREE 10 ML: 5 INJECTION INTRAVENOUS at 21:07

## 2022-05-18 NOTE — PROGRESS NOTES
Hospitalist Progress Note    Daily Progress Note: 2022 11:46 AM      Danya Rendon                                            MRN: [id number]                                  :1955      Subjective:     Pt examined and seen at bedside. Patient is alert and oriented x 3, there are no signs and symptoms of noted distress. Patient reports that he is feeling a little better when compared to previous day, he complains of an headache and chills. Patient denies chest pain, shortness of breath, nausea, vomiting, and diarrhea. No acute events reported overnight. Today's Plan: Continue IV Acyclovir for today with hopes of transitioning to oral at discharge, continue supplemental oxygen for now given that patient becomes hypoxic with resting, continue to trend lactic acid. Objective:     Visit Vitals  BP (!) 149/66 (BP 1 Location: Right upper arm, BP Patient Position: At rest;Semi fowlers)   Pulse 70   Temp 98.5 °F (36.9 °C)   Resp 16   Ht 6' 2\" (1.88 m)   Wt 101.4 kg (223 lb 8 oz)   SpO2 90% Comment: nasal canula out to eat   BMI 28.70 kg/m²    O2 Flow Rate (L/min): 2 l/min O2 Device: Nasal cannula (2L)    Temp (24hrs), Av.7 °F (37.6 °C), Min:98 °F (36.7 °C), Max:101.9 °F (38.8 °C)      No intake/output data recorded.  1901 -  0700  In: 1737.5 [P.O.:240; I.V.:1497.5]  Out: 1200 [Urine:1200]    PHYSICAL EXAM:  Physical Exam  Vitals and nursing note reviewed. Constitutional:       Appearance: Normal appearance. She is normal weight. HENT:      Head: Normocephalic and atraumatic. Mouth/Throat:      Mouth: Mucous membranes are moist.   Eyes:      Extraocular Movements: Extraocular movements intact. Pupils: Pupils are equal, round, and reactive to light. Cardiovascular:      Rate and Rhythm: Normal rate and regular rhythm. Pulses: Normal pulses. Heart sounds: Normal heart sounds.    Pulmonary:      Effort: Pulmonary effort is normal.      Breath sounds: Normal breath sounds. Abdominal:      General: Abdomen is flat. Bowel sounds are normal.      Palpations: Abdomen is soft. Musculoskeletal:      Cervical back: Normal range of motion and neck supple. Skin:     General: Erythematous rash with closed blisters, extending from right torso to right side of the back     Capillary Refill: Capillary refill takes less than 2 seconds. Neurological:      General: No focal deficit present. Mental Status: She is alert and oriented to person, place, and time.    Psychiatric:         Mood and Affect: Mood normal.     Current Facility-Administered Medications   Medication Dose Route Frequency    losartan (COZAAR) tablet 100 mg  100 mg Oral DAILY    And    hydroCHLOROthiazide (HYDRODIURIL) tablet 12.5 mg  12.5 mg Oral DAILY    acyclovir (ZOVIRAX) 800 mg in 0.9% sodium chloride 250 mL IVPB  10 mg/kg (Ideal) IntraVENous Q8H    HYDROcodone-acetaminophen (NORCO) 5-325 mg per tablet 1 Tablet  1 Tablet Oral Q4H PRN    [START ON 5/19/2022] predniSONE (DELTASONE) tablet 30 mg  30 mg Oral ONCE    Followed by   [place] ON 5/20/2022] predniSONE (DELTASONE) tablet 20 mg  20 mg Oral ONCE    Followed by   [place] ON 5/21/2022] predniSONE (DELTASONE) tablet 10 mg  10 mg Oral ONCE    [START ON 5/22/2022] predniSONE (DELTASONE) tablet 5 mg  5 mg Oral ONCE    metoprolol succinate (TOPROL-XL) XL tablet 50 mg  50 mg Oral DAILY    atorvastatin (LIPITOR) tablet 20 mg  20 mg Oral DAILY    traMADoL (ULTRAM) tablet 50 mg  50 mg Oral Q6H PRN    sodium chloride (NS) flush 5-40 mL  5-40 mL IntraVENous Q8H    sodium chloride (NS) flush 5-40 mL  5-40 mL IntraVENous PRN    acetaminophen (TYLENOL) tablet 650 mg  650 mg Oral Q6H PRN    Or    acetaminophen (TYLENOL) suppository 650 mg  650 mg Rectal Q6H PRN    polyethylene glycol (MIRALAX) packet 17 g  17 g Oral DAILY PRN    ondansetron (ZOFRAN ODT) tablet 4 mg  4 mg Oral Q8H PRN    Or    ondansetron (ZOFRAN) injection 4 mg  4 mg IntraVENous Q6H PRN        Assessment/Plan:     Hypoxia  -on room air while asleep, oxygen saturation 87-88%, on supplemental 2 L 92%  unknown reason of Hypoxia, patient denies shortness of breath and history of sleep apnea  -rapid Covid negative  -CXR: no acute findings  -will continue supplemental oxygen for now, patient may need outpatient workup for sleep apnea     Herpes Zoster Shingles  -erythemus rash covering right torso extending to the right side of the back  -patient recently started on Acyclovir, Prednisone, and Tramadol on previous day  -patient complaining of increased pain and headache  -febrile, in the ED  -started IV Acyclovir in the ED, and given Rocephin 1 gram, will continue IV  Acyclovir for now and transition to oral at discharge  -Tylenol PRN for temperature greater than 100.4  -implement airborne isolation  -lactic acid in the ED 2.4, patient given 1 Liter IVF, lactic acid remains elevated, patient not septic,continue to trend lactic acid, awaiting UA, blood cultures no growth at 12 hours, continue IV hydration  -CXR; no acute findings    Headache  -likely secondary to shingles, improving  -CT: no acute findings, patient denies visual changes, neck stiffness, and acute mental changes  -Tylenol and Tramadol PRN for headache  -patient was offered to have LP performed in the ED, patient refused, no invasive procedures     Hypertension  -Chronic/mildly elevated  -continue Hyzaar and Toprol  -monitor BP closely     Hypercholesteremia  -chronic, continue Crestor      DVT Prophylaxis: SCDs    Code Status: Full    Care Plan discussed with: Patient and spouse    Clinical time 25 minutes with >50% of visit spent in counseling and coordination of care    Signed by: ROSARIO Sage 5/18/2022

## 2022-05-18 NOTE — PROGRESS NOTES
Care Management Interventions  PCP Verified by CM: Yes  Palliative Care Criteria Met (RRAT>21 & CHF Dx)?: No  Mode of Transport at Discharge: Other (see comment) (Family)  Transition of Care Consult (CM Consult): Discharge Planning  MyChart Signup: No  Discharge Durable Medical Equipment: No  Health Maintenance Reviewed: Yes  Physical Therapy Consult: No  Occupational Therapy Consult: No  Speech Therapy Consult: No  Support Systems: Spouse/Significant Other  Confirm Follow Up Transport: Family  The Patient and/or Patient Representative was Provided with a Choice of Provider and Agrees with the Discharge Plan?: Yes  Freedom of Choice List was Provided with Basic Dialogue that Supports the Patient's Individualized Plan of Care/Goals, Treatment Preferences and Shares the Quality Data Associated with the Providers?: Yes  Discharge Location  Patient Expects to be Discharged to[de-identified] Home   Reason for Admission: Chart reviewed and noted patient presents with C/O a rash, headache and fever. Pt states he noticed that the rash was on his torso area, but than begun to spread to his back and the pain intensified. He is also having a headache located across his frontal region of his head, fever and chills. He saw his PCP on Monday 5/16 and was started on antiviral medication Famciclovir, prednisone and tramadol for pain. He started taking the antiviral and the prednisone as soon as he received the medication, but his symptoms worsen.      Dx: Herpes Zoster Shingles    PMH: HTN, HLD                      RUR Score: NA                    Plan for utilizing home health: TBD         PCP: First and Last name:  Yadi Dhaliwal MD     Name of Practice:    Are you a current patient: Yes/No:    Approximate date of last visit:    Can you participate in a virtual visit with your PCP:                     Current Advanced Directive/Advance Care Plan: Full Code- No ACP       Healthcare Decision Maker: Wife- Laura Lassiter- [phone]   Click here to complete [place] including selection of the Healthcare Decision Maker Relationship (ie \"Primary\")                             Transition of Care Plan: Discharge planning is aimed at transition to home. Patient lives at home with his wife. He doesn't use any DME. Discharge planning with [facility] and Medication Reconciliation. Nurse will make follow-up appointments prior to discharge. Patient will be returning back home at discharge.

## 2022-05-18 NOTE — ED NOTES
TRANSFER - OUT REPORT:    Verbal report given to Rachel on [place]  being transferred to  for routine progression of care       Report consisted of patients Situation, Background, Assessment and   Recommendations(SBAR). Information from the following report(s) SBAR was reviewed with the receiving nurse. Lines:   Peripheral IV 05/17/22 Left Antecubital (Active)   Site Assessment Clean, dry, & intact 05/17/22 1757   Phlebitis Assessment 0 05/17/22 1757   Infiltration Assessment 0 05/17/22 1757   Dressing Status Clean, dry, & intact; Occlusive 05/17/22 1757   Hub Color/Line Status Pink 05/17/22 1757   Action Taken Blood drawn 05/17/22 1757        Opportunity for questions and clarification was provided.       Patient transported with:

## 2022-05-18 NOTE — H&P
History and Physical    Subjective:     Nhan Vora is a 74 y.o. male with a past medical history for hypertension and hyperlipidemia, patient presents to the ED with a chief complaint of a rash, headache, and fever. Patient reports that the symptoms started on Friday, he states he noticed that the rash was on his torso area, but on Saturday the rash began to spread to his back and the pain intensified. Other accompanying symptoms included a headache located across the his frontal region of his head, fever and chills. Patient denies chest pain, shortness of breath, nausea, vomiting, diarrhea, abdominal pain. Patient reports that he was able to see his PCP on Monday and he was started on antiviral medication Famciclovir, prednisone, and tramadol for pain, he reports that he started taking the antiviral and the prednisone as soon as he received the medication, but his symptoms worsen, and his PCP told him to come to the ED. When the patient first arrived to the ED his temperature was 101.9, lactic acid 2.4, blood cultures was drawn, rapid COVID-negative, CT of the head show no significant abnormality, chest x-ray showed no acute cardiopulmonary process, and EKG showed sinus rhythm. While in the ED patient did receive 1 L of IV fluids, 1 g of IV Rocephin, 1000 mg of oral Tylenol, 500 mg of IV acyclovir, 125 mg of IV Solu-Medrol, and 1 mg of IV Dilaudid. Admit to telemetry for observation. Patient assessed in the ED, at the bedside, patient is alert and oriented, there is no acute distress noted. Patient agrees to admission for a diagnosis of shingles outbreak with elevated lactic acid, treatment to include IV hydration, continue oral acyclovir, and oral prednisone. Airborne isolation implemented.     Past Medical History:   Diagnosis Date    High blood pressure       Past Surgical History:   Procedure Laterality Date    HX APPENDECTOMY      age 7    HX HEENT Bilateral     Lens Implant    HX REFRACTIVE SURGERY       History reviewed. No pertinent family history. Social History     Tobacco Use    Smoking status: Never Smoker    Smokeless tobacco: Never Used   Substance Use Topics    Alcohol use: Never     Comment: occasional       Prior to Admission medications    Medication Sig Start Date End Date Taking? Authorizing Provider   predniSONE (DELTASONE) 10 mg tablet Take 10 mg by mouth daily. Take 6 tablets 1, take 4 tablets day 2, day 3 take 3 tablets, day 4 take 2 tablets, day 5 take 1 tablet, and day 6 & take 1/2 tablet. 5/16/22  Yes Other, MD Rody   famciclovir (FAMVIR) 500 mg tablet Take 500 mg by mouth three (3) times daily. Yes Provider, Historical   simvastatin (ZOCOR) 40 mg tablet Take 1 Tablet by mouth nightly. 12/17/21  Yes Francoise Real MD   metoprolol succinate (TOPROL-XL) 50 mg XL tablet Take 1 Tablet by mouth daily. 12/17/21  Yes Francoise Real MD   losartan-hydroCHLOROthiazide [facility]) 100-12.5 mg per tablet Take 1 Tablet by mouth daily. 12/17/21  Yes Francoise Real MD   traMADoL Flanagan Boron) 50 mg tablet every eight (8) hours as needed for Pain. 5/17/22   Other, MD Rody   sildenafil citrate (VIAGRA) 100 mg tablet 0.5 or 1 daily as needed  Indications: the inability to have an erection 12/17/21   Francoise Real MD   ketorolac (TORADOL) 10 mg tablet Take 1 Tab by mouth every six (6) hours as needed for Pain.  2/16/21   Shon Lee MD     No Known Allergies       REVIEW OF SYSTEMS:       Total of 12 systems reviewed as follows:    Positive = Red  Constitutional: Positive for malaise/fatigue and weakness, positive for fever    HENT: Negative for ear pain, headaches, negative for loss of sense of taste and smell   Eyes: Negative for blurred vision and double vision   Skin: Negative for itching, positive for rash   Cardiovascular: Negative for chest pain, palpitations, negative for swelling   Respiratory: Negative for shortness or breath, negative for cough, negative for sputum production   Gastrointestinal: Negative for abdominal pain, constipation, nausea, vomiting, and diarrhea   Genitourinary: Negative for dysuria, frequency, and hematuria   Musculoskeletal: Negative for joint pain and myalgias   Neurological: Negative for dizziness, seizures, and positive for headaches   Psychiatric: Negative for depression and anxiousness     Objective:   VITALS:    Visit Vitals  /75   Pulse 83   Temp 99.8 °F (37.7 °C)   Resp 16   Ht 6' 2\" (1.88 m)   Wt 113.4 kg (250 lb)   SpO2 95%   BMI 32.10 kg/m²       PHYSICAL EXAM:  Positive = Red  Constitutional: Alert and oriented x 3 and no noted acute distress appears to be stated age. HENT: Atraumatic, nose midline, oropharynx clear ad moist, trachea midline, no supraclavicular   Eyes: Conjunctiva normal and pupils equal   Skin: Dry, warm, and dry. Erythematous rash to right torso extending to right side of back. Cardiovascular: Regular rate and rhythm, normal heart sounds, no murmurs, pulses palpable, no noted edema   Respiratory: Lungs clear throughout, no wheezes, rales, or rhonchi, effort normal   Gastrointestinal: Appearance normal, bowel sounds are normal, bowl soft and non-tender   Genitourinary: Deferred   Musculoskeletal: Normal ROM   Neurological: Alert and oriented x 3, awake. No facial droop. No slurred speech. Hand grasps equal. Strength 5/5 in all extremities.  Intact sensations   Psychiatric: Affect normal, Answers questions appropriately     __________________________________________________  Care Plan discussed with:    Comments   Patient X    Family      RN     Care Manager                    Consultant:      _______________________________________________________________________  Expected  Disposition:   Home with Family X   HH/PT/OT/RN    SNF/LTC    SUKHWINDER    ________________________________________________________________________    Labs:  Recent Results (from the past 24 hour(s))   COVID-19 RAPID TEST Collection Time: 05/17/22  5:35 PM   Result Value Ref Range    COVID-19 rapid test Not Detected Not Detected     INFLUENZA A & B AG (RAPID TEST)    Collection Time: 05/17/22  5:35 PM   Result Value Ref Range    Influenza A Antigen Negative Negative      Influenza B Antigen Negative Negative     CBC WITH AUTOMATED DIFF    Collection Time: 05/17/22  5:40 PM   Result Value Ref Range    WBC 12.5 4.6 - 13.2 K/uL    RBC 3.91 (L) 4.35 - 5.65 M/uL    HGB 13.4 13.0 - 16.0 g/dL    HCT 38.7 36.0 - 48.0 %    MCV 99.0 78.0 - 100.0 FL    MCH 34.3 (H) 24.0 - 34.0 PG    MCHC 34.6 31.0 - 37.0 g/dL    RDW 11.9 11.6 - 14.5 %    PLATELET 979 703 - 897 K/uL    MPV 10.2 9.2 - 11.8 FL    NRBC 0.0 0.0  WBC    ABSOLUTE NRBC 0.00 0.00 - 0.01 K/uL    NEUTROPHILS 90 (H) 40 - 73 %    LYMPHOCYTES 7 (L) 21 - 52 %    MONOCYTES 2 (L) 3 - 10 %    EOSINOPHILS 0 0 - 5 %    BASOPHILS 0 0 - 2 %    IMMATURE GRANULOCYTES 1 (H) 0 - 0.5 %    ABS. NEUTROPHILS 11.2 (H) 1.8 - 8.0 K/UL    ABS. LYMPHOCYTES 0.9 0.9 - 3.6 K/UL    ABS. MONOCYTES 0.3 0.05 - 1.2 K/UL    ABS. EOSINOPHILS 0.0 0.0 - 0.4 K/UL    ABS. BASOPHILS 0.0 0.0 - 0.1 K/UL    ABS. IMM. GRANS. 0.1 (H) 0.00 - 0.04 K/UL    DF AUTOMATED     METABOLIC PANEL, COMPREHENSIVE    Collection Time: 05/17/22  5:40 PM   Result Value Ref Range    Sodium 134 (L) 136 - 145 mmol/L    Potassium 4.0 3.5 - 5.5 mmol/L    Chloride 98 (L) 100 - 111 mmol/L    CO2 29 21 - 32 mmol/L    Anion gap 7 3.0 - 18.0 mmol/L    Glucose 187 (H) 74 - 99 mg/dL    BUN 19 (H) 7 - 18 mg/dL    Creatinine 1.16 0.60 - 1.30 mg/dL    BUN/Creatinine ratio 16 12 - 20      GFR est AA >60 >60 ml/min/1.73m2    GFR est non-AA >60 >60 ml/min/1.73m2    Calcium 8.8 8.5 - 10.1 mg/dL    Bilirubin, total 0.4 0.2 - 1.0 mg/dL    AST (SGOT) 18 10 - 38 U/L    ALT (SGPT) 30 16 - 61 U/L    Alk.  phosphatase 72 45 - 117 U/L    Protein, total 7.6 6.4 - 8.2 g/dL    Albumin 3.8 3.4 - 5.0 g/dL    Globulin 3.8 2.0 - 4.0 g/dL    A-G Ratio 1.0 0.8 - 1.7     LACTIC ACID    Collection Time: 05/17/22  5:40 PM   Result Value Ref Range    Lactic acid 2.4 (HH) 0.4 - 2.0 mmol/L   PROTHROMBIN TIME + INR    Collection Time: 05/17/22  5:40 PM   Result Value Ref Range    Prothrombin time 13.1 11.5 - 15.2 sec    INR 1.0 0.8 - 1.2     CULTURE, BLOOD    Collection Time: 05/17/22  6:40 PM    Specimen: Blood   Result Value Ref Range    Special Requests: No Special Requests      Culture result: PENDING    EKG, 12 LEAD, INITIAL    Collection Time: 05/17/22  6:41 PM   Result Value Ref Range    Ventricular Rate 85 BPM    Atrial Rate 85 BPM    P-R Interval 201 ms    QRS Duration 102 ms    Q-T Interval 380 ms    QTC Calculation (Bezet) 452 ms    Calculated P Axis 43 degrees    Calculated R Axis 55 degrees    Calculated T Axis 29 degrees    Diagnosis Sinus rhythm    CULTURE, BLOOD    Collection Time: 05/17/22  7:00 PM    Specimen: Blood   Result Value Ref Range    Special Requests: No Special Requests      Culture result: PENDING        Imaging:  CT HEAD WO CONT    Result Date: 5/17/2022  EXAM: CT of the head INDICATION: Severe headache. COMPARISON: None. TECHNIQUE: Axial CT imaging of the head was performed without nonionic intravenous contrast. Multiplanar reformats were generated. One or more dose reduction techniques were used on this CT: automated exposure control, adjustment of the mAs and/or kVp according to patient size, and iterative reconstruction techniques. The specific techniques used on this CT exam have been documented in the patient's electronic medical record. _______________ FINDINGS: BRAIN: No evidence of intra-cranial hemorrhage or mass. CALVARIA: No fracture or suspicious bone lesion. OTHER: None. _______________     1. No significant abnormality. XR CHEST PORT    Result Date: 5/17/2022  CLINICAL HISTORY:  Fever. COMPARISONS:  None.  TECHNIQUE:  single frontal view of the chest ------------------------------------------ FINDINGS: Lungs: Hypoinflation with bandlike atelectasis in the lung bases. No consolidation or pleural fluid. Mediastinum: Mild cardiomegaly, exaggerated by hypoinflation. Bones: No evidence of fracture or suspicious bone lesion. ------------------------------------------    No acute cardiopulmonary process. Assessment & Plan:       Herpes Zoster Shingles  -erythemus rash covering right torso extending to the right side of the back  -patient recently started on Acyclovir, Prednisone, and Tramadol on previous day  -patient complaining of increased pain and headache  -febrile, in the ED  -started IV Acyclovir in the ED, and given Rocephin 1 gram, will continue oral Acyclovir  -Tylenol PRN for temperature greater than 100.4  -implement airborne isolation  -lactic acid in the ED 2.4, patient given 1 Liter IVF, repeat lactic acid pending  -CXR; no acute findings, blood cultures pending    Headache  -likely secondary to shingles  -CT: no acute findings  -Tylenol and Tramadol PRN for headache    Hypertension  -Chronic/mildly elevated  -continue Hyzaar and Toprol  -monitor BP closely    Hypercholesteremia  -chronic, continue Crestor     TOTAL TIME:  45 Minutes    Code Status: Full    Prophylaxis:  SCDs    Electronically Signed : Marcela Lee, [facility] Medicine Service    Please note that this dictation was completed with [employer], the computer voice recognition software. Quite often unanticipated grammatical, syntax, homophones, and other interpretive errors are inadvertently transcribed by the computer software. Please disregard these errors. Please excuse any errors that have escaped final proofreading. Thank you.

## 2022-05-18 NOTE — PROGRESS NOTES
Patient asleep on room air sats were 87-88% once patient woke up and we talked sats increased to 93%, place back on 2L NC sats remained 92-93%, told patient he could remove if he was awake but while asleep NC needs to remain on, Donovan Cárdenas NP aware

## 2022-05-18 NOTE — ASSESSMENT & PLAN NOTE
-erythemus rash covering right torso extending to the right side of the back  -patient recently started on Acyclovir, Prednisone, and Tramadol on previous day  -patient complaining of increased pain and headache  -febrile, in the ED  -started IV Acyclovir in the ED, and given Rocephin 1 gram, will continue oral Acyclovir  -Tylenol PRN for temperature greater than 100.4  -implement airborne isolation  -lactic acid in the ED 2.4, patient given 1 Liter IVF, repeat lactic acid pending

## 2022-05-18 NOTE — PROGRESS NOTES
2120-Pt admitted to [facility] 252, family at bedside. Call bell in reach. 2358-Pt resting in bed, family member at bedside, no needs voiced, IVF hung, call bell in reach. 0300-Pt complaint of headache and pain, PRN norco given, urinal emptied, call bell in reach. Also critical lactic called into hospitalist JAYDA Malhotra NP, no new orders. 0600-Pt states headache is better, urinal emptied, call bell in reach.

## 2022-05-18 NOTE — PROGRESS NOTES
Comprehensive Nutrition Assessment    Type and Reason for Visit: Initial    Nutrition Recommendations/Plan:   1. Cardiac diet     Malnutrition Assessment:  Malnutrition Status:  Insufficient data (Shingles Airborne Isolation, but no hx wt loss, reduced appetite, N/V/D) (05/18/22 1502)    Context:  Acute illness     Findings of the 6 clinical characteristics of malnutrition:   Energy Intake:  No significant decrease in energy intake  Weight Loss:  No significant weight loss     Body Fat Loss:  Unable to assess,     Muscle Mass Loss:  Unable to assess,    Fluid Accumulation:  Unable to assess,     Strength:  Not performed         Nutrition Assessment:    73 yo male PMH: HTN, HLD    Nutrition Related Findings:    overweight BMI 28.7. Pt with Shingles outbreak symptoms of rash headache and fever started on Friday. Pt admitted here being treated IVF, oral acyclovir, oral prednisone. Pt on Airborne Isolation.  Wound Type: None   Albumin 3.8  Some hyperglycemia but no hx of DM is receiving prednisone    Recent Results (from the past 24 hour(s))   URINALYSIS W/ RFLX MICROSCOPIC    Collection Time: 05/17/22  5:30 PM   Result Value Ref Range    Color Yellow      Appearance Clear      Specific gravity 1.013 1.005 - 1.030      pH (UA) 5.5 5.0 - 8.0      Protein Negative Negative mg/dL    Glucose 100 (A) Negative mg/dL    Ketone Negative Negative mg/dL    Bilirubin Negative Negative      Blood Negative Negative      Urobilinogen 0.2 0.2 - 1.0 EU/dL    Nitrites Negative Negative      Leukocyte Esterase Negative Negative     COVID-19 RAPID TEST    Collection Time: 05/17/22  5:35 PM   Result Value Ref Range    COVID-19 rapid test Not Detected Not Detected     INFLUENZA A & B AG (RAPID TEST)    Collection Time: 05/17/22  5:35 PM   Result Value Ref Range    Influenza A Antigen Negative Negative      Influenza B Antigen Negative Negative     CBC WITH AUTOMATED DIFF    Collection Time: 05/17/22  5:40 PM   Result Value Ref Range WBC 12.5 4.6 - 13.2 K/uL    RBC 3.91 (L) 4.35 - 5.65 M/uL    HGB 13.4 13.0 - 16.0 g/dL    HCT 38.7 36.0 - 48.0 %    MCV 99.0 78.0 - 100.0 FL    MCH 34.3 (H) 24.0 - 34.0 PG    MCHC 34.6 31.0 - 37.0 g/dL    RDW 11.9 11.6 - 14.5 %    PLATELET 641 464 - 335 K/uL    MPV 10.2 9.2 - 11.8 FL    NRBC 0.0 0.0  WBC    ABSOLUTE NRBC 0.00 0.00 - 0.01 K/uL    NEUTROPHILS 90 (H) 40 - 73 %    LYMPHOCYTES 7 (L) 21 - 52 %    MONOCYTES 2 (L) 3 - 10 %    EOSINOPHILS 0 0 - 5 %    BASOPHILS 0 0 - 2 %    IMMATURE GRANULOCYTES 1 (H) 0 - 0.5 %    ABS. NEUTROPHILS 11.2 (H) 1.8 - 8.0 K/UL    ABS. LYMPHOCYTES 0.9 0.9 - 3.6 K/UL    ABS. MONOCYTES 0.3 0.05 - 1.2 K/UL    ABS. EOSINOPHILS 0.0 0.0 - 0.4 K/UL    ABS. BASOPHILS 0.0 0.0 - 0.1 K/UL    ABS. IMM. GRANS. 0.1 (H) 0.00 - 0.04 K/UL    DF AUTOMATED     METABOLIC PANEL, COMPREHENSIVE    Collection Time: 05/17/22  5:40 PM   Result Value Ref Range    Sodium 134 (L) 136 - 145 mmol/L    Potassium 4.0 3.5 - 5.5 mmol/L    Chloride 98 (L) 100 - 111 mmol/L    CO2 29 21 - 32 mmol/L    Anion gap 7 3.0 - 18.0 mmol/L    Glucose 187 (H) 74 - 99 mg/dL    BUN 19 (H) 7 - 18 mg/dL    Creatinine 1.16 0.60 - 1.30 mg/dL    BUN/Creatinine ratio 16 12 - 20      GFR est AA >60 >60 ml/min/1.73m2    GFR est non-AA >60 >60 ml/min/1.73m2    Calcium 8.8 8.5 - 10.1 mg/dL    Bilirubin, total 0.4 0.2 - 1.0 mg/dL    AST (SGOT) 18 10 - 38 U/L    ALT (SGPT) 30 16 - 61 U/L    Alk.  phosphatase 72 45 - 117 U/L    Protein, total 7.6 6.4 - 8.2 g/dL    Albumin 3.8 3.4 - 5.0 g/dL    Globulin 3.8 2.0 - 4.0 g/dL    A-G Ratio 1.0 0.8 - 1.7     LACTIC ACID    Collection Time: 05/17/22  5:40 PM   Result Value Ref Range    Lactic acid 2.4 (HH) 0.4 - 2.0 mmol/L   PROTHROMBIN TIME + INR    Collection Time: 05/17/22  5:40 PM   Result Value Ref Range    Prothrombin time 13.1 11.5 - 15.2 sec    INR 1.0 0.8 - 1.2     CULTURE, BLOOD    Collection Time: 05/17/22  6:40 PM    Specimen: Blood   Result Value Ref Range    Special Requests: No Special Requests      Culture result: No growth after 12 hours     EKG, 12 LEAD, INITIAL    Collection Time: 05/17/22  6:41 PM   Result Value Ref Range    Ventricular Rate 85 BPM    Atrial Rate 85 BPM    P-R Interval 201 ms    QRS Duration 102 ms    Q-T Interval 380 ms    QTC Calculation (Bezet) 452 ms    Calculated P Axis 43 degrees    Calculated R Axis 55 degrees    Calculated T Axis 29 degrees    Diagnosis       Sinus rhythm    Confirmed by Alonzo Castillo ([id number]) on 5/18/2022 2:08:02 PM     CULTURE, BLOOD    Collection Time: 05/17/22  7:00 PM    Specimen: Blood   Result Value Ref Range    Special Requests: No Special Requests      Culture result: No growth after 12 hours     LACTIC ACID    Collection Time: 05/17/22  8:15 PM   Result Value Ref Range    Lactic acid HEMOLYSIS INDEX 3 0.4 - 2.0 mmol/L   METABOLIC PANEL, BASIC    Collection Time: 05/18/22  2:37 AM   Result Value Ref Range    Sodium 132 (L) 136 - 145 mmol/L    Potassium 4.1 3.5 - 5.5 mmol/L    Chloride 97 (L) 100 - 111 mmol/L    CO2 29 21 - 32 mmol/L    Anion gap 6 3.0 - 18.0 mmol/L    Glucose 202 (H) 74 - 99 mg/dL    BUN 19 (H) 7 - 18 mg/dL    Creatinine 1.02 0.60 - 1.30 mg/dL    BUN/Creatinine ratio 19 12 - 20      GFR est AA >60 >60 ml/min/1.73m2    GFR est non-AA >60 >60 ml/min/1.73m2    Calcium 8.3 (L) 8.5 - 10.1 mg/dL   MAGNESIUM    Collection Time: 05/18/22  2:37 AM   Result Value Ref Range    Magnesium 1.8 1.6 - 2.6 mg/dL   CBC W/O DIFF    Collection Time: 05/18/22  2:37 AM   Result Value Ref Range    WBC 11.8 4.6 - 13.2 K/uL    RBC 3.81 (L) 4.35 - 5.65 M/uL    HGB 12.9 (L) 13.0 - 16.0 g/dL    HCT 37.6 36.0 - 48.0 %    MCV 98.7 78.0 - 100.0 FL    MCH 33.9 24.0 - 34.0 PG    MCHC 34.3 31.0 - 37.0 g/dL    RDW 11.9 11.6 - 14.5 %    PLATELET 580 840 - 983 K/uL    MPV 10.8 9.2 - 11.8 FL    NRBC 0.0 0.0  WBC    ABSOLUTE NRBC 0.00 0.00 - 0.01 K/uL   LACTIC ACID    Collection Time: 05/18/22  2:37 AM   Result Value Ref Range    Lactic acid 3.0 (HH) 0.4 - 2.0 mmol/L   LACTIC ACID    Collection Time: 05/18/22  9:07 AM   Result Value Ref Range    Lactic acid 2.9 (HH) 0.4 - 2.0 mmol/L       Current Nutrition Intake & Therapies:  Average Meal Intake: %  Average Supplement Intake: None ordered  ADULT DIET Regular; Low Fat/Low Chol/High Fiber/2 gm Na; Low Sodium (2 gm)    Anthropometric Measures:  Height: 6' 2\" (188 cm)  Ideal Body Weight (IBW): 190 lbs (86 kg)  Admission Body Weight: 223 lb  Current Body Wt:  101.2 kg (223 lb), 117.4 % IBW. Bed scale  Current BMI (kg/m2): 28.6                          BMI Category: Overweight (BMI 25.0-29. 9)    Estimated Daily Nutrient Needs:  Energy Requirements Based On: Kcal/kg (20-25 brandie/kg)  Weight Used for Energy Requirements: Admission (101 kg)  Energy (kcal/day): 0316-6452 kcal/day  Weight Used for Protein Requirements: Admission (0.8-1 g/kg)  Protein (g/day):  g/day  Method Used for Fluid Requirements: 1 ml/kcal  Fluid (ml/day): 2941-6262 mL/day    Nutrition Diagnosis:   · No nutrition diagnosis at this time related to   as evidenced by        Nutrition Interventions:   Food and/or Nutrient Delivery: Continue current diet  Nutrition Education/Counseling: Education not appropriate,Education not indicated  Coordination of Nutrition Care: Continue to monitor while inpatient       Goals:     Goals: PO intake 75% or greater,by next RD assessment       Nutrition Monitoring and Evaluation:      Food/Nutrient Intake Outcomes: Food and nutrient intake  Physical Signs/Symptoms Outcomes: Meal time behavior,Weight     F/U: 5/23/2022    Discharge Planning:    Continue current diet    [address]: [phone]

## 2022-05-19 ENCOUNTER — APPOINTMENT (OUTPATIENT)
Dept: GENERAL RADIOLOGY | Age: 67
DRG: 596 | End: 2022-05-19
Attending: INTERNAL MEDICINE
Payer: MEDICARE

## 2022-05-19 ENCOUNTER — ANESTHESIA (OUTPATIENT)
Dept: MEDSURG UNIT | Age: 67
DRG: 596 | End: 2022-05-19
Payer: MEDICARE

## 2022-05-19 ENCOUNTER — ANESTHESIA EVENT (OUTPATIENT)
Dept: MEDSURG UNIT | Age: 67
DRG: 596 | End: 2022-05-19
Payer: MEDICARE

## 2022-05-19 PROBLEM — A41.9 SEPSIS (HCC): Status: ACTIVE | Noted: 2022-05-19

## 2022-05-19 LAB
ANION GAP SERPL CALC-SCNC: 6 MMOL/L (ref 3–18)
APPEARANCE CSF: CLEAR
BNP SERPL-MCNC: 678 PG/ML (ref 0–900)
BUN SERPL-MCNC: 22 MG/DL (ref 7–18)
BUN/CREAT SERPL: 20 (ref 12–20)
CA-I BLD-MCNC: 8.4 MG/DL (ref 8.5–10.1)
CHLORIDE SERPL-SCNC: 99 MMOL/L (ref 100–111)
CO2 SERPL-SCNC: 30 MMOL/L (ref 21–32)
COLOR CSF: COLORLESS
COLOR SPUN CSF: COLORLESS
CREAT SERPL-MCNC: 1.09 MG/DL (ref 0.6–1.3)
EOSINOPHIL NFR CSF MANUAL: 1 %
ERYTHROCYTE [DISTWIDTH] IN BLOOD BY AUTOMATED COUNT: 12 % (ref 11.6–14.5)
GLUCOSE SERPL-MCNC: 181 MG/DL (ref 74–99)
HCT VFR BLD AUTO: 35.9 % (ref 36–48)
HGB BLD-MCNC: 12.2 G/DL (ref 13–16)
LACTATE SERPL-SCNC: 1.8 MMOL/L (ref 0.4–2)
LACTATE SERPL-SCNC: 2.1 MMOL/L (ref 0.4–2)
LYMPHOCYTES NFR CSF MANUAL: 78 % (ref 40–80)
MCH RBC QN AUTO: 34.1 PG (ref 24–34)
MCHC RBC AUTO-ENTMCNC: 34 G/DL (ref 31–37)
MCV RBC AUTO: 100.3 FL (ref 78–100)
MONOCYTES NFR CSF MANUAL: 21 % (ref 15–45)
NRBC # BLD: 0 K/UL (ref 0–0.01)
NRBC BLD-RTO: 0 PER 100 WBC
PLATELET # BLD AUTO: 163 K/UL (ref 135–420)
PMV BLD AUTO: 10.5 FL (ref 9.2–11.8)
POTASSIUM SERPL-SCNC: 4.3 MMOL/L (ref 3.5–5.5)
RBC # BLD AUTO: 3.58 M/UL (ref 4.35–5.65)
RBC # CSF: ABNORMAL /CU MM
SODIUM SERPL-SCNC: 135 MMOL/L (ref 136–145)
TUBE # CSF: 3
WBC # BLD AUTO: 14.5 K/UL (ref 4.6–13.2)
WBC # CSF: 28 /CU MM (ref 0–5)

## 2022-05-19 PROCEDURE — 83605 ASSAY OF LACTIC ACID: CPT

## 2022-05-19 PROCEDURE — 74011000250 HC RX REV CODE- 250: Performed by: NURSE PRACTITIONER

## 2022-05-19 PROCEDURE — 74011000250 HC RX REV CODE- 250

## 2022-05-19 PROCEDURE — 65270000029 HC RM PRIVATE

## 2022-05-19 PROCEDURE — 80048 BASIC METABOLIC PNL TOTAL CA: CPT

## 2022-05-19 PROCEDURE — 85027 COMPLETE CBC AUTOMATED: CPT

## 2022-05-19 PROCEDURE — 94761 N-INVAS EAR/PLS OXIMETRY MLT: CPT

## 2022-05-19 PROCEDURE — 83880 ASSAY OF NATRIURETIC PEPTIDE: CPT

## 2022-05-19 PROCEDURE — 36415 COLL VENOUS BLD VENIPUNCTURE: CPT

## 2022-05-19 PROCEDURE — G0378 HOSPITAL OBSERVATION PER HR: HCPCS

## 2022-05-19 PROCEDURE — 84157 ASSAY OF PROTEIN OTHER: CPT

## 2022-05-19 PROCEDURE — 74011250637 HC RX REV CODE- 250/637: Performed by: NURSE PRACTITIONER

## 2022-05-19 PROCEDURE — 77010033678 HC OXYGEN DAILY

## 2022-05-19 PROCEDURE — 74011250636 HC RX REV CODE- 250/636: Performed by: INTERNAL MEDICINE

## 2022-05-19 PROCEDURE — 87205 SMEAR GRAM STAIN: CPT

## 2022-05-19 PROCEDURE — 89050 BODY FLUID CELL COUNT: CPT

## 2022-05-19 PROCEDURE — 89051 BODY FLUID CELL COUNT: CPT

## 2022-05-19 PROCEDURE — 87483 CNS DNA AMP PROBE TYPE 12-25: CPT

## 2022-05-19 PROCEDURE — 71045 X-RAY EXAM CHEST 1 VIEW: CPT

## 2022-05-19 PROCEDURE — 87529 HSV DNA AMP PROBE: CPT

## 2022-05-19 PROCEDURE — 74011636637 HC RX REV CODE- 636/637: Performed by: INTERNAL MEDICINE

## 2022-05-19 PROCEDURE — 82945 GLUCOSE OTHER FLUID: CPT

## 2022-05-19 RX ORDER — HYDROMORPHONE HYDROCHLORIDE 1 MG/ML
0.5 INJECTION, SOLUTION INTRAMUSCULAR; INTRAVENOUS; SUBCUTANEOUS
Status: DISCONTINUED | OUTPATIENT
Start: 2022-05-19 | End: 2022-05-21 | Stop reason: HOSPADM

## 2022-05-19 RX ORDER — LIDOCAINE HYDROCHLORIDE 10 MG/ML
INJECTION INFILTRATION; PERINEURAL
Status: COMPLETED
Start: 2022-05-19 | End: 2022-05-19

## 2022-05-19 RX ADMIN — ATORVASTATIN CALCIUM 20 MG: 10 TABLET, FILM COATED ORAL at 09:20

## 2022-05-19 RX ADMIN — HYDROMORPHONE HYDROCHLORIDE 0.5 MG: 1 INJECTION, SOLUTION INTRAMUSCULAR; INTRAVENOUS; SUBCUTANEOUS at 12:58

## 2022-05-19 RX ADMIN — SODIUM CHLORIDE, PRESERVATIVE FREE 10 ML: 5 INJECTION INTRAVENOUS at 05:59

## 2022-05-19 RX ADMIN — HYDROCHLOROTHIAZIDE 12.5 MG: 25 TABLET ORAL at 09:20

## 2022-05-19 RX ADMIN — PREDNISONE 30 MG: 20 TABLET ORAL at 09:20

## 2022-05-19 RX ADMIN — METOPROLOL SUCCINATE 50 MG: 50 TABLET, EXTENDED RELEASE ORAL at 09:20

## 2022-05-19 RX ADMIN — LOSARTAN POTASSIUM 100 MG: 25 TABLET, FILM COATED ORAL at 09:19

## 2022-05-19 RX ADMIN — ACYCLOVIR SODIUM 800 MG: 50 INJECTION, SOLUTION INTRAVENOUS at 22:24

## 2022-05-19 RX ADMIN — SODIUM CHLORIDE, PRESERVATIVE FREE 10 ML: 5 INJECTION INTRAVENOUS at 22:35

## 2022-05-19 RX ADMIN — ACYCLOVIR SODIUM 800 MG: 50 INJECTION, SOLUTION INTRAVENOUS at 15:39

## 2022-05-19 RX ADMIN — SODIUM CHLORIDE, PRESERVATIVE FREE 10 ML: 5 INJECTION INTRAVENOUS at 14:59

## 2022-05-19 RX ADMIN — ACYCLOVIR SODIUM 800 MG: 50 INJECTION, SOLUTION INTRAVENOUS at 05:59

## 2022-05-19 RX ADMIN — LIDOCAINE HYDROCHLORIDE: 10 INJECTION, SOLUTION INFILTRATION; PERINEURAL at 14:39

## 2022-05-19 NOTE — PROGRESS NOTES
1931 - Rounded on pt, wife at bedside. Provided pt with requested fresh ice water. Pt c/o ongoing HA now rated 3/10 and that pain level is acceptable to him. Pain level increased when pt sat up in bed. Released draw of lactic acid d/t last one at 1524 and is due q 6 hours if above 2. V/s and assessment complete. No other needs expressed at this time. CBWR.     2107 - Rounded on pt, continuous IV fluids paused, IV flushed and Zovirax started as ordered per MAR. Pt tolerated well. Answered all pt's questions and concerns, no other needs expressed at this time. CBWR.     4543 - Pt used CB to notify nurse that IV Anti-viral is complete. Writer at bedside, continuous IV fluids resumed. No other needs expressed at this time. CBWR.     0210 - Pt V/s complete. Pt's BP is elevated 168/87 and rechecked for 163/80. Hospitalist notified and no new orders received.     0326 - Abhi in the lab called to notify writer of critical Lactic acid level of 2.1, hospitalist notified and no new orders received. Lactic acid is trending downwards. 6139 - Rounded on pt, pt resting in bed, wife at bedside. Antiviral administered as ordered per MAR. Pt tolerated well. Fresh ice chips and ice water provided to pt. No other needs expressed to writer at this time. CBWR.

## 2022-05-19 NOTE — PROGRESS NOTES
5503 Received report from Puneet May 49 patient resting with eyes closed with a cloth over eyes easily aroused. 4207 Patient states that he has a h/a 3/10 verbalize that he was to spoke with MD. Sheeba Ruby MD aware.  Patient also did not eat any of his breakfast.

## 2022-05-19 NOTE — ANESTHESIA POSTPROCEDURE EVALUATION
* No procedures listed *. No value filed. Anesthesia Post Evaluation      Multimodal analgesia: multimodal analgesia used between 6 hours prior to anesthesia start to PACU discharge  Patient location during evaluation: floor  Patient participation: complete - patient cannot participate  Level of consciousness: awake and alert  Pain management: adequate  Airway patency: patent  Anesthetic complications: no  Cardiovascular status: stable  Respiratory status: acceptable  Hydration status: acceptable  Comments: DC when criteria met.   Post anesthesia nausea and vomiting:  none  Final Post Anesthesia Temperature Assessment:  Normothermia (36.0-37.5 degrees C)      INITIAL Post-op Vital signs:   Vitals Value Taken Time   /85 05/19/22 1440   Temp 36.4 °C (97.6 °F) 05/19/22 1440   Pulse 65 05/19/22 1440   Resp 18 05/19/22 1440   SpO2

## 2022-05-19 NOTE — PROGRESS NOTES
1400- Received care of pt.     1430- CRNA in room for procedure at bedside. 1515- Med hung. 1630- No complaints of pain. 65- Wife at bedside. Pt stated he wanted to wait until later for pain meds.

## 2022-05-19 NOTE — ANESTHESIA PROCEDURE NOTES
Lumbar Puncture    Start time: 5/19/2022 2:20 PM  End time: 5/19/2022 2:43 PM  Performed by: Fay Zarate CRNA  Authorized by: Fay Zarate CRNA     Pre-Procedure  Indication:  Evaluation for infection  Preanesthetic Checklist: patient identified, risks and benefits discussed, anesthesia consent, site marked, patient being monitored and timeout performed    Timeout Time: 14:20 EDT        Anesthesia and Sedation:   Anesthesia:  Local infiltration  Local Anesthetic:  Lidocaine 1% without epinephrine    Patient Sedated: No      Procedure:   Prep:  Betadine  Lumbar Space:  L3-L4 interspace  Patient position:  Seated  Needle Gauge:  21 G  Needle Type:  Sprotte tip  Needle length:  3.5  Attempts:  1  Fluid appearance:  Clear  Tubes of fluid:  4  Total volume (ml):  4    Assessment:   Post-procedure:  Site cleaned  Patient tolerance:  Patient tolerated the procedure well with no immediate complications  Total MD procedure time:  16-30 minutes

## 2022-05-19 NOTE — ANESTHESIA PREPROCEDURE EVALUATION
Relevant Problems   No relevant active problems       Anesthetic History   No history of anesthetic complications            Review of Systems / Medical History  Patient summary reviewed, nursing notes reviewed and pertinent labs reviewed    Pulmonary  Within defined limits                 Neuro/Psych   Within defined limits           Cardiovascular    Hypertension              Exercise tolerance: >4 METS     GI/Hepatic/Renal  Within defined limits              Endo/Other  Within defined limits           Other Findings              Physical Exam    Airway  Mallampati: II  TM Distance: 4 - 6 cm  Neck ROM: normal range of motion   Mouth opening: Normal     Cardiovascular  Regular rate and rhythm,  S1 and S2 normal,  no murmur, click, rub, or gallop  Rhythm: regular  Rate: normal         Dental  No notable dental hx       Pulmonary  Breath sounds clear to auscultation               Abdominal  GI exam deferred       Other Findings            Anesthetic Plan    ASA: 2              Anesthetic plan and risks discussed with: Patient

## 2022-05-20 LAB
ANION GAP SERPL CALC-SCNC: 4 MMOL/L (ref 3–18)
BASOPHILS # BLD: 0.1 K/UL (ref 0–0.1)
BASOPHILS NFR BLD: 1 % (ref 0–2)
BNP SERPL-MCNC: 582 PG/ML (ref 0–900)
BUN SERPL-MCNC: 17 MG/DL (ref 7–18)
BUN/CREAT SERPL: 18 (ref 12–20)
CA-I BLD-MCNC: 8.7 MG/DL (ref 8.5–10.1)
CHLORIDE SERPL-SCNC: 99 MMOL/L (ref 100–111)
CO2 SERPL-SCNC: 34 MMOL/L (ref 21–32)
CREAT SERPL-MCNC: 0.94 MG/DL (ref 0.6–1.3)
DIFFERENTIAL METHOD BLD: ABNORMAL
EOSINOPHIL # BLD: 0 K/UL (ref 0–0.4)
EOSINOPHIL NFR BLD: 0 % (ref 0–5)
ERYTHROCYTE [DISTWIDTH] IN BLOOD BY AUTOMATED COUNT: 11.8 % (ref 11.6–14.5)
GLUCOSE CSF-MCNC: 67 MG/DL (ref 40–70)
GLUCOSE SERPL-MCNC: 126 MG/DL (ref 74–99)
HCT VFR BLD AUTO: 40.2 % (ref 36–48)
HGB BLD-MCNC: 13.6 G/DL (ref 13–16)
HSV1 DNA SPEC QL NAA+PROBE: NORMAL
HSV2 DNA SPEC QL NAA+PROBE: NORMAL
IMM GRANULOCYTES # BLD AUTO: 0 K/UL
IMM GRANULOCYTES NFR BLD AUTO: 0 %
LYMPHOCYTES # BLD: 2.8 K/UL (ref 0.9–3.6)
LYMPHOCYTES NFR BLD: 22 % (ref 21–52)
MAGNESIUM SERPL-MCNC: 2.4 MG/DL (ref 1.6–2.6)
MCH RBC QN AUTO: 33.3 PG (ref 24–34)
MCHC RBC AUTO-ENTMCNC: 33.8 G/DL (ref 31–37)
MCV RBC AUTO: 98.5 FL (ref 78–100)
METAMYELOCYTES NFR BLD MANUAL: 1 %
MONOCYTES # BLD: 1 K/UL (ref 0.05–1.2)
MONOCYTES NFR BLD: 8 % (ref 3–10)
NEUTS SEG # BLD: 8.3 K/UL (ref 1.8–8)
NEUTS SEG NFR BLD: 65 % (ref 40–73)
NRBC # BLD: 0 K/UL (ref 0–0.01)
NRBC BLD-RTO: 0 PER 100 WBC
OTHER CELLS NFR BLD MANUAL: 3 %
PHOSPHATE SERPL-MCNC: 1.6 MG/DL (ref 2.5–4.9)
PLATELET # BLD AUTO: 179 K/UL (ref 135–420)
PMV BLD AUTO: 10.4 FL (ref 9.2–11.8)
POTASSIUM SERPL-SCNC: 4 MMOL/L (ref 3.5–5.5)
PROT CSF-MCNC: 58 MG/DL (ref 15–45)
RBC # BLD AUTO: 4.08 M/UL (ref 4.35–5.65)
RBC MORPH BLD: ABNORMAL
SODIUM SERPL-SCNC: 137 MMOL/L (ref 136–145)
SPECIMEN SOURCE: NORMAL
TUBE # CSF: 1
TUBE # CSF: 1
WBC # BLD AUTO: 12.7 K/UL (ref 4.6–13.2)

## 2022-05-20 PROCEDURE — 65270000029 HC RM PRIVATE

## 2022-05-20 PROCEDURE — 80048 BASIC METABOLIC PNL TOTAL CA: CPT

## 2022-05-20 PROCEDURE — 86787 VARICELLA-ZOSTER ANTIBODY: CPT

## 2022-05-20 PROCEDURE — 74011250637 HC RX REV CODE- 250/637: Performed by: NURSE PRACTITIONER

## 2022-05-20 PROCEDURE — 36415 COLL VENOUS BLD VENIPUNCTURE: CPT

## 2022-05-20 PROCEDURE — 74011636637 HC RX REV CODE- 636/637: Performed by: INTERNAL MEDICINE

## 2022-05-20 PROCEDURE — 84100 ASSAY OF PHOSPHORUS: CPT

## 2022-05-20 PROCEDURE — 85025 COMPLETE CBC W/AUTO DIFF WBC: CPT

## 2022-05-20 PROCEDURE — 83880 ASSAY OF NATRIURETIC PEPTIDE: CPT

## 2022-05-20 PROCEDURE — 83735 ASSAY OF MAGNESIUM: CPT

## 2022-05-20 PROCEDURE — 74011250636 HC RX REV CODE- 250/636: Performed by: INTERNAL MEDICINE

## 2022-05-20 PROCEDURE — 74011000250 HC RX REV CODE- 250: Performed by: NURSE PRACTITIONER

## 2022-05-20 RX ADMIN — SODIUM CHLORIDE, PRESERVATIVE FREE 10 ML: 5 INJECTION INTRAVENOUS at 15:40

## 2022-05-20 RX ADMIN — SODIUM CHLORIDE, PRESERVATIVE FREE 10 ML: 5 INJECTION INTRAVENOUS at 21:11

## 2022-05-20 RX ADMIN — ACYCLOVIR SODIUM 800 MG: 50 INJECTION, SOLUTION INTRAVENOUS at 21:10

## 2022-05-20 RX ADMIN — HYDROCODONE BITARTRATE AND ACETAMINOPHEN 1 TABLET: 5; 325 TABLET ORAL at 09:04

## 2022-05-20 RX ADMIN — SODIUM CHLORIDE, PRESERVATIVE FREE 10 ML: 5 INJECTION INTRAVENOUS at 06:20

## 2022-05-20 RX ADMIN — ATORVASTATIN CALCIUM 20 MG: 10 TABLET, FILM COATED ORAL at 09:05

## 2022-05-20 RX ADMIN — HYDROCHLOROTHIAZIDE 12.5 MG: 25 TABLET ORAL at 09:04

## 2022-05-20 RX ADMIN — ACYCLOVIR SODIUM 800 MG: 50 INJECTION, SOLUTION INTRAVENOUS at 15:39

## 2022-05-20 RX ADMIN — METOPROLOL SUCCINATE 50 MG: 50 TABLET, EXTENDED RELEASE ORAL at 09:04

## 2022-05-20 RX ADMIN — LOSARTAN POTASSIUM 100 MG: 25 TABLET, FILM COATED ORAL at 09:05

## 2022-05-20 RX ADMIN — PREDNISONE 20 MG: 20 TABLET ORAL at 09:04

## 2022-05-20 RX ADMIN — ACYCLOVIR SODIUM 800 MG: 50 INJECTION, SOLUTION INTRAVENOUS at 06:20

## 2022-05-20 NOTE — PROGRESS NOTES
Hospitalist Progress Note             Date of Service:  2022  NAME:  Felicia Aponte  :  1955  MRN:  [id number]    Assessment/Plan:      Hypoxia, acute resp failure- cont incentive rubén, stop ivf  -on room air while asleep, oxygen saturation 87-88%, on supplemental 2 L 92%  unknown reason of Hypoxia, patient denies shortness of breath and history of sleep apnea  -rapid Covid negative  -CXR: no acute findings  -will continue supplemental oxygen for now, patient may need outpatient workup for sleep apnea      Herpes Zoster Shingles- consulted ID at outside [facility], we need LP, will have anesethia do it today, send for Meningitis Pathogen Panel, as well as cell count, glucose, protien, culture, cont iv acyclovir  -erythemus rash covering right torso extending to the right side of the back  -patient recently started on Acyclovir, Prednisone, and Tramadol on previous day  -patient complaining of increased pain and headache  -febrile, in the ED  -started IV Acyclovir in the ED, and given Rocephin 1 gram, will continue IV  Acyclovir for now and transition to oral at discharge  -Tylenol PRN for temperature greater than 100.4  -implement airborne isolation  -lactic acid in the ED 2.4, patient given 1 Liter IVF, lactic acid remains elevated, patient not septic,continue to trend lactic acid, awaiting UA, blood cultures no growth at 12 hours, continue IV hydration  -CXR; no acute findings     Headache  -likely secondary to shingles, improving  -CT: no acute findings, patient denies visual changes, neck stiffness, and acute mental changes  -Tylenol and Tramadol PRN for headache  -patient was offered to have LP performed in the ED, patient refused, no invasive procedures  - trial dilaudid     Hypertension  -Chronic/mildly elevated  -continue Hyzaar and Toprol  -monitor BP closely     Hypercholesteremia  -chronic, continue [facility] Problems  Date Reviewed: 12/16/2021          Codes Class Noted POA    Sepsis ([facility] [address] ICD-10-CM: A41.9  ICD-9-CM: 038.9, 995.91  5/19/2022 Unknown        Herpes zoster ICD-10-CM: B02.9  ICD-9-CM: 053.9  5/17/2022 Unknown        Hypertension ICD-10-CM: I10  ICD-9-CM: 401.9  5/17/2022 Unknown        Headache ICD-10-CM: R51.9  ICD-9-CM: 784.0  5/17/2022 Unknown                Review of Systems:   A comprehensive review of systems was negative except for that written in the HPI. Frontal headache, no neck stiffness or photophobia      Vital Signs:    Last 24hrs VS reviewed since prior progress note. Most recent are:  Visit Vitals  BP (!) 168/85 (BP 1 Location: Right upper arm, BP Patient Position: At rest)   Pulse 61   Temp 98.7 °F (37.1 °C)   Resp 16   Ht 6' 2\" (1.88 m)   Wt 101.4 kg (223 lb 8 oz)   SpO2 92%   BMI 28.70 kg/m²         Intake/Output Summary (Last 24 hours) at 5/20/2022 1033  Last data filed at 5/19/2022 1957  Gross per 24 hour   Intake 300 ml   Output    Net 300 ml        Physical Examination:       Physical Exam  Vitals and nursing note reviewed. Constitutional:       Appearance: Normal appearance. She is normal weight. HENT:      Head: Normocephalic and atraumatic. Mouth/Throat:      Mouth: Mucous membranes are moist.   Eyes:      Extraocular Movements: Extraocular movements intact. Pupils: Pupils are equal, round, and reactive to light. Cardiovascular:      Rate and Rhythm: Normal rate and regular rhythm. Pulses: Normal pulses. Heart sounds: Normal heart sounds. Pulmonary:      Effort: Pulmonary effort is normal.      Breath sounds: Normal breath sounds. Abdominal:      General: Abdomen is flat. Bowel sounds are normal.      Palpations: Abdomen is soft. Musculoskeletal:      Cervical back: Normal range of motion and neck supple.    Skin:     General: Erythematous rash with closed blisters, extending from right torso to right side of the back Capillary Refill: Capillary refill takes less than 2 seconds. Neurological:      General: No focal deficit present. Mental Status: She is alert and oriented to person, place, and time. Psychiatric:         Mood and Affect: Mood normal.        Data Review:    Review and/or order of clinical lab test  Review and/or order of tests in the radiology section of [facility]  Review and/or order of tests in the medicine section of [facility]      Labs:     Recent Labs     05/20/22 0820 05/19/22 0229   WBC 12.7 14.5*   HGB 13.6 12.2*   HCT 40.2 35.9*    163     Recent Labs     05/20/22  0820 05/19/22 0229 05/18/22  0237    135* 132*   K 4.0 4.3 4.1   CL 99* 99* 97*   CO2 34* 30 29   BUN 17 22* 19*   CREA 0.94 1.09 1.02   * 181* 202*   CA 8.7 8.4* 8.3*   MG 2.4  --  1.8   PHOS 1.6*  --   --      Recent Labs     05/17/22  1740   ALT 30   AP 72   TBILI 0.4   TP 7.6   ALB 3.8   GLOB 3.8     Recent Labs     05/17/22  1740   INR 1.0   PTP 13.1      No results for input(s): FE, TIBC, PSAT, FERR in the last 72 hours. No results found for: FOL, RBCF   No results for input(s): PH, PCO2, PO2 in the last 72 hours. No results for input(s): CPK, CKNDX, TROIQ in the last 72 hours.     No lab exists for component: CPKMB  No results found for: CHOL, CHOLX, CHLST, CHOLV, HDL, HDLP, LDL, LDLC, DLDLP, TGLX, TRIGL, TRIGP, CHHD, CHHDX  No results found for: [address]  [facility]  Lab Results   Component Value Date/Time    Color Yellow 05/17/2022 05:30 PM    Appearance Clear 05/17/2022 05:30 PM    Specific gravity 1.013 05/17/2022 05:30 PM    pH (UA) 5.5 05/17/2022 05:30 PM    Protein Negative 05/17/2022 05:30 PM    Glucose 100 (A) 05/17/2022 05:30 PM    Ketone Negative 05/17/2022 05:30 PM    Bilirubin Negative 05/17/2022 05:30 PM    Urobilinogen 0.2 05/17/2022 05:30 PM    Nitrites Negative 05/17/2022 05:30 PM    Leukocyte Esterase Negative 05/17/2022 05:30 PM         Medications Reviewed:     Current Facility-Administered Medications Medication Dose Route Frequency    HYDROmorphone (DILAUDID) injection 0.5 mg  0.5 mg IntraVENous Q8H PRN    losartan (COZAAR) tablet 100 mg  100 mg Oral DAILY    And    hydroCHLOROthiazide (HYDRODIURIL) tablet 12.5 mg  12.5 mg Oral DAILY    acyclovir (ZOVIRAX) 800 mg in 0.9% sodium chloride 250 mL IVPB  10 mg/kg (Ideal) IntraVENous Q8H    HYDROcodone-acetaminophen (NORCO) 5-325 mg per tablet 1 Tablet  1 Tablet Oral Q4H PRN    [START ON 5/21/2022] predniSONE (DELTASONE) tablet 10 mg  10 mg Oral ONCE    [START ON 5/22/2022] predniSONE (DELTASONE) tablet 5 mg  5 mg Oral ONCE    ketorolac (TORADOL) injection 15 mg  15 mg IntraVENous Q6H PRN    metoprolol succinate (TOPROL-XL) XL tablet 50 mg  50 mg Oral DAILY    atorvastatin (LIPITOR) tablet 20 mg  20 mg Oral DAILY    sodium chloride (NS) flush 5-40 mL  5-40 mL IntraVENous Q8H    sodium chloride (NS) flush 5-40 mL  5-40 mL IntraVENous PRN    acetaminophen (TYLENOL) tablet 650 mg  650 mg Oral Q6H PRN    Or    acetaminophen (TYLENOL) suppository 650 mg  650 mg Rectal Q6H PRN    polyethylene glycol (MIRALAX) packet 17 g  17 g Oral DAILY PRN    ondansetron (ZOFRAN ODT) tablet 4 mg  4 mg Oral Q8H PRN    Or    ondansetron (ZOFRAN) injection 4 mg  4 mg IntraVENous Q6H PRN     ______________________________________________________________________  EXPECTED LENGTH OF STAY: - - -  ACTUAL LENGTH OF STAY:          1                 Terry Kirby MD

## 2022-05-20 NOTE — PROGRESS NOTES
1900-Assumed care of pt from off going nurse. 2200-IV acyclovir hung, no acute distress or sob, no needs voiced, at this time, call bell in reach. 2340-Pt up to bathroom and back to bed, BLE scd's applied, no acute distress or sob, call bell in reach. 0300-Pt resting in bed, no acute distress or sob, call bell in reach. 0600-Pt resting in bed, denies headache throughout the night, no needs voiced, IV acyclovir hung. Call bell in reach.

## 2022-05-20 NOTE — PROGRESS NOTES
1949 - Rounded on pt, VSS, assessment complete. Pt denies any pain or discomfort at this time. No needs expressed to writer at this time. CBWR.     3407 - Rounded on pt, Zovirax started as ordered per MAR. 1/2 NS stopped as ordered. Pt tolerated well.     0100 - Rounded on pt, pt resting in bed, wife at bedside. No needs expressed at this time. CBWR.     Averyll.Primas - Writer received critical lab result from Karen Baez MT from [facility] micro lab. Lumbar puncture has a positive result of Varcella Zooster Virus. 0124 - Lab result called into [facility], writer advised she would look over pt's chart. 3103 - IV Anti viral started as ordered. Pt dneies any c/o pain or discomfort at this time.

## 2022-05-20 NOTE — PROGRESS NOTES
Problem: Risk for Spread of Infection  Goal: Prevent transmission of infectious organism to others  Description: Prevent the transmission of infectious organisms to other patients, staff members, and visitors.   5/20/2022 1022 by Les Jackson LPN  Outcome: Progressing Towards Goal  5/20/2022 1021 by Les Jackson LPN  Reactivated     Problem: Patient Education: Go to Patient Education Activity  Goal: Patient/Family Education  Outcome: Progressing Towards Goal

## 2022-05-20 NOTE — PROGRESS NOTES
0700- Assumed care of Mr. Heidi Alarcon from off going nurse. Bedside report received. He is currently resting in bed. Voices no other concerns at this time. Call bell within reach. All needs have currently been met. Patient's most recent vital signs:  Blood pressure (!) 168/85, pulse 61, temperature 98.7 °F (37.1 °C), resp. rate 16, height 6' 2\" (1.88 m), weight 101.4 kg (223 lb 8 oz), SpO2 92 %. 0915- In to give morning meds, reports pain 4/10, medicated with norco per order. Has questions regarding \"ways to not get shingles, ever again\". Nurse, Patient and pt's wife lightly discussed Shingles vaccination. However, this nurse did inform pt and wife that he should discuss future vaccinations with physician or NP today. Nursing supervisor also made aware of his concern. Ice water pitcher also refilled at this time. 1300- Pt resting in bed, denies pain, reports no concerns. CBWR    1702- Pt resting in bed with wife at bedside. No complaints. Denies pain. Vitals WNL. Pt asking if LP results are back yet, informed him that they are not and most likely will not be resulted until tomorrow. CBWR.

## 2022-05-20 NOTE — PROGRESS NOTES
Hospitalist Progress Note             Date of Service:  2022  NAME:  Zuri Gama  :  1955  MRN:  [id number]       Assessment/Plan:   Meningitis, viral vs aseptic  - suspect due to zoster  - cont iv acyclovir,   - narcotics prn pain  - reviewed cell count, WBC elevated to 28, no neutrophils  - once the pathogen panel is back, pt comfortable with minimal narcotics he can take at home for few days, he will be able to dc home  - I had consulted with ID at [facility] yesterday, which provided guidance, if any furhter input needed will call through transfer center      Hypoxia, acute resp failure- cont incentive rubén, stop ivf.  Pt no longer wearing 02 today  -on room air while asleep, oxygen saturation 87-88%, on supplemental 2 L 92%  unknown reason of Hypoxia, patient denies shortness of breath and history of sleep apnea  -rapid Covid negative  -CXR: no acute findings  -will continue supplemental oxygen for now, patient may need outpatient workup for sleep apnea      Herpes Zoster Shingles- consulted ID at outside [facility], we need LP, will have anesethia do it today, send for Meningitis Pathogen Panel, as well as cell count, glucose, protien, culture, cont iv acyclovir  -erythemus rash covering right torso extending to the right side of the back  -patient recently started on Acyclovir, Prednisone, and Tramadol on previous day  -patient complaining of increased pain and headache  -febrile, in the ED  -started IV Acyclovir in the ED, and given Rocephin 1 gram, will continue IV  Acyclovir for now and transition to oral at discharge  -Tylenol PRN for temperature greater than 100.4  -implement airborne isolation  -lactic acid in the ED 2.4, patient given 1 Liter IVF, lactic acid remains elevated, patient not septic,continue to trend lactic acid, awaiting UA, blood cultures no growth at 12 hours, continue IV hydration  -CXR; no acute findings     Headache  -likely secondary to shingles, improving  -CT: no acute findings, patient denies visual changes, neck stiffness, and acute mental changes  -Tylenol and Tramadol PRN for headache  -patient was offered to have LP performed in the ED, patient refused, no invasive procedures  - trial dilaudid     Hypertension  -Chronic/mildly elevated  -continue Hyzaar and Toprol  -monitor BP closely     Hypercholesteremia  -chronic, continue [facility] Problems  Date Reviewed: 12/16/2021          Codes Class Noted POA    Sepsis ([facility]) ICD-10-CM: A41.9  ICD-9-CM: 038.9, 995.91  5/19/2022 Unknown        Herpes zoster ICD-10-CM: B02.9  ICD-9-CM: 053.9  5/17/2022 Unknown        Hypertension ICD-10-CM: I10  ICD-9-CM: 401.9  5/17/2022 Unknown        Headache ICD-10-CM: R51.9  ICD-9-CM: 784.0  5/17/2022 Unknown                Review of Systems:   A comprehensive review of systems was negative except for that written in the HPI. Vital Signs:    Last 24hrs VS reviewed since prior progress note. Most recent are:  Visit Vitals  BP (!) 168/85 (BP 1 Location: Right upper arm, BP Patient Position: At rest)   Pulse 61   Temp 98.7 °F (37.1 °C)   Resp 16   Ht 6' 2\" (1.88 m)   Wt 101.4 kg (223 lb 8 oz)   SpO2 92%   BMI 28.70 kg/m²         Intake/Output Summary (Last 24 hours) at 5/20/2022 1050  Last data filed at 5/19/2022 1957  Gross per 24 hour   Intake 300 ml   Output    Net 300 ml        Physical Examination:             General:          Alert, cooperative, no distress, appears stated age. - appears much more comfortable today  HEENT:           Atraumatic, anicteric sclerae, pink conjunctivae                          No oral ulcers, mucosa moist, throat clear, dentition fair  Neck:               Supple, symmetrical  Lungs:             Clear to auscultation bilaterally. No Wheezing or Rhonchi. No rales. Chest wall:      No tenderness  No Accessory muscle use.   Heart: Regular  rhythm,  No  murmur   No edema  Abdomen:        Soft, non-tender. Not distended. Bowel sounds normal  Extremities:     No cyanosis. No clubbing,                            Skin turgor normal, Capillary refill normal  Skin:                Not pale. Not Jaundiced  No rashes   Psych:             Not anxious or agitated. Neurologic:      Alert, moves all extremities, answers questions appropriately and responds to commands        Data Review:    Review and/or order of clinical lab test  Review and/or order of tests in the radiology section of [facility]  Review and/or order of tests in the medicine section of [facility]      Labs:     Recent Labs     05/20/22 0820 05/19/22 0229   WBC 12.7 14.5*   HGB 13.6 12.2*   HCT 40.2 35.9*    163     Recent Labs     05/20/22 0820 05/19/22 0229 05/18/22 0237    135* 132*   K 4.0 4.3 4.1   CL 99* 99* 97*   CO2 34* 30 29   BUN 17 22* 19*   CREA 0.94 1.09 1.02   * 181* 202*   CA 8.7 8.4* 8.3*   MG 2.4  --  1.8   PHOS 1.6*  --   --      Recent Labs     05/17/22  1740   ALT 30   AP 72   TBILI 0.4   TP 7.6   ALB 3.8   GLOB 3.8     Recent Labs     05/17/22  1740   INR 1.0   PTP 13.1      No results for input(s): FE, TIBC, PSAT, FERR in the last 72 hours. No results found for: FOL, RBCF   No results for input(s): PH, PCO2, PO2 in the last 72 hours. No results for input(s): CPK, CKNDX, TROIQ in the last 72 hours.     No lab exists for component: CPKMB  No results found for: CHOL, CHOLX, CHLST, CHOLV, HDL, HDLP, LDL, LDLC, DLDLP, TGLX, TRIGL, TRIGP, CHHD, CHHDX  No results found for: [facility]  Lab Results   Component Value Date/Time    Color Yellow 05/17/2022 05:30 PM    Appearance Clear 05/17/2022 05:30 PM    Specific gravity 1.013 05/17/2022 05:30 PM    pH (UA) 5.5 05/17/2022 05:30 PM    Protein Negative 05/17/2022 05:30 PM    Glucose 100 (A) 05/17/2022 05:30 PM    Ketone Negative 05/17/2022 05:30 PM    Bilirubin Negative 05/17/2022 05:30 PM    Urobilinogen 0.2 05/17/2022 05:30 PM    Nitrites Negative 05/17/2022 05:30 PM    Leukocyte Esterase Negative 05/17/2022 05:30 PM         Medications Reviewed:     Current Facility-Administered Medications   Medication Dose Route Frequency    HYDROmorphone (DILAUDID) injection 0.5 mg  0.5 mg IntraVENous Q8H PRN    losartan (COZAAR) tablet 100 mg  100 mg Oral DAILY    And    hydroCHLOROthiazide (HYDRODIURIL) tablet 12.5 mg  12.5 mg Oral DAILY    acyclovir (ZOVIRAX) 800 mg in 0.9% sodium chloride 250 mL IVPB  10 mg/kg (Ideal) IntraVENous Q8H    HYDROcodone-acetaminophen (NORCO) 5-325 mg per tablet 1 Tablet  1 Tablet Oral Q4H PRN    [START ON 5/21/2022] predniSONE (DELTASONE) tablet 10 mg  10 mg Oral ONCE    [START ON 5/22/2022] predniSONE (DELTASONE) tablet 5 mg  5 mg Oral ONCE    ketorolac (TORADOL) injection 15 mg  15 mg IntraVENous Q6H PRN    metoprolol succinate (TOPROL-XL) XL tablet 50 mg  50 mg Oral DAILY    atorvastatin (LIPITOR) tablet 20 mg  20 mg Oral DAILY    sodium chloride (NS) flush 5-40 mL  5-40 mL IntraVENous Q8H    sodium chloride (NS) flush 5-40 mL  5-40 mL IntraVENous PRN    acetaminophen (TYLENOL) tablet 650 mg  650 mg Oral Q6H PRN    Or    acetaminophen (TYLENOL) suppository 650 mg  650 mg Rectal Q6H PRN    polyethylene glycol (MIRALAX) packet 17 g  17 g Oral DAILY PRN    ondansetron (ZOFRAN ODT) tablet 4 mg  4 mg Oral Q8H PRN    Or    ondansetron (ZOFRAN) injection 4 mg  4 mg IntraVENous Q6H PRN     ______________________________________________________________________  EXPECTED LENGTH OF STAY: 3d 7h  ACTUAL LENGTH OF STAY:          1                 Nelly Willis MD

## 2022-05-21 VITALS
SYSTOLIC BLOOD PRESSURE: 163 MMHG | DIASTOLIC BLOOD PRESSURE: 81 MMHG | HEIGHT: 74 IN | WEIGHT: 223.5 LBS | HEART RATE: 57 BPM | RESPIRATION RATE: 18 BRPM | TEMPERATURE: 98.1 F | BODY MASS INDEX: 28.68 KG/M2 | OXYGEN SATURATION: 96 %

## 2022-05-21 LAB
ANION GAP SERPL CALC-SCNC: 6 MMOL/L (ref 3–18)
BASOPHILS # BLD: 0 K/UL (ref 0–0.1)
BASOPHILS NFR BLD: 0 % (ref 0–2)
BUN SERPL-MCNC: 18 MG/DL (ref 7–18)
BUN/CREAT SERPL: 20 (ref 12–20)
CA-I BLD-MCNC: 8.5 MG/DL (ref 8.5–10.1)
CHLORIDE SERPL-SCNC: 103 MMOL/L (ref 100–111)
CO2 SERPL-SCNC: 31 MMOL/L (ref 21–32)
CREAT SERPL-MCNC: 0.92 MG/DL (ref 0.6–1.3)
DIFFERENTIAL METHOD BLD: ABNORMAL
EOSINOPHIL # BLD: 0 K/UL (ref 0–0.4)
EOSINOPHIL NFR BLD: 0 % (ref 0–5)
ERYTHROCYTE [DISTWIDTH] IN BLOOD BY AUTOMATED COUNT: 11.7 % (ref 11.6–14.5)
GLUCOSE SERPL-MCNC: 114 MG/DL (ref 74–99)
HCT VFR BLD AUTO: 38.4 % (ref 36–48)
HGB BLD-MCNC: 13.2 G/DL (ref 13–16)
IMM GRANULOCYTES # BLD AUTO: 0 K/UL
IMM GRANULOCYTES NFR BLD AUTO: 0 %
LYMPHOCYTES # BLD: 2.8 K/UL (ref 0.9–3.6)
LYMPHOCYTES NFR BLD: 24 % (ref 21–52)
MAGNESIUM SERPL-MCNC: 2.1 MG/DL (ref 1.6–2.6)
MCH RBC QN AUTO: 33.6 PG (ref 24–34)
MCHC RBC AUTO-ENTMCNC: 34.4 G/DL (ref 31–37)
MCV RBC AUTO: 97.7 FL (ref 78–100)
MONOCYTES # BLD: 0.5 K/UL (ref 0.05–1.2)
MONOCYTES NFR BLD: 4 % (ref 3–10)
NEUTS SEG # BLD: 6.1 K/UL (ref 1.8–8)
NEUTS SEG NFR BLD: 52 % (ref 40–73)
NRBC # BLD: 0 K/UL (ref 0–0.01)
NRBC BLD-RTO: 0 PER 100 WBC
OTHER CELLS NFR BLD MANUAL: 20 %
PHOSPHATE SERPL-MCNC: 3 MG/DL (ref 2.5–4.9)
PLATELET # BLD AUTO: 177 K/UL (ref 135–420)
PMV BLD AUTO: 10.2 FL (ref 9.2–11.8)
POTASSIUM SERPL-SCNC: 3.9 MMOL/L (ref 3.5–5.5)
RBC # BLD AUTO: 3.93 M/UL (ref 4.35–5.65)
RBC MORPH BLD: ABNORMAL
SODIUM SERPL-SCNC: 140 MMOL/L (ref 136–145)
WBC # BLD AUTO: 11.7 K/UL (ref 4.6–13.2)

## 2022-05-21 PROCEDURE — 36415 COLL VENOUS BLD VENIPUNCTURE: CPT

## 2022-05-21 PROCEDURE — 80048 BASIC METABOLIC PNL TOTAL CA: CPT

## 2022-05-21 PROCEDURE — 84100 ASSAY OF PHOSPHORUS: CPT

## 2022-05-21 PROCEDURE — 83735 ASSAY OF MAGNESIUM: CPT

## 2022-05-21 PROCEDURE — 74011250636 HC RX REV CODE- 250/636: Performed by: INTERNAL MEDICINE

## 2022-05-21 PROCEDURE — 74011250637 HC RX REV CODE- 250/637: Performed by: NURSE PRACTITIONER

## 2022-05-21 PROCEDURE — 74011000250 HC RX REV CODE- 250: Performed by: NURSE PRACTITIONER

## 2022-05-21 PROCEDURE — 74011636637 HC RX REV CODE- 636/637: Performed by: INTERNAL MEDICINE

## 2022-05-21 PROCEDURE — 85025 COMPLETE CBC W/AUTO DIFF WBC: CPT

## 2022-05-21 RX ORDER — ACYCLOVIR 800 MG/1
800 TABLET ORAL 3 TIMES DAILY
Qty: 33 TABLET | Refills: 0 | Status: SHIPPED | OUTPATIENT
Start: 2022-05-21 | End: 2022-06-01

## 2022-05-21 RX ADMIN — ACYCLOVIR SODIUM 800 MG: 50 INJECTION, SOLUTION INTRAVENOUS at 05:35

## 2022-05-21 RX ADMIN — ATORVASTATIN CALCIUM 20 MG: 10 TABLET, FILM COATED ORAL at 08:35

## 2022-05-21 RX ADMIN — LOSARTAN POTASSIUM 100 MG: 25 TABLET, FILM COATED ORAL at 08:36

## 2022-05-21 RX ADMIN — METOPROLOL SUCCINATE 50 MG: 50 TABLET, EXTENDED RELEASE ORAL at 08:36

## 2022-05-21 RX ADMIN — SODIUM CHLORIDE, PRESERVATIVE FREE 10 ML: 5 INJECTION INTRAVENOUS at 05:35

## 2022-05-21 RX ADMIN — HYDROCHLOROTHIAZIDE 12.5 MG: 25 TABLET ORAL at 08:33

## 2022-05-21 RX ADMIN — PREDNISONE 10 MG: 10 TABLET ORAL at 08:33

## 2022-05-21 NOTE — PROGRESS NOTES
Physician Progress Note      PATIENT:               Sg Roque  CSN #:                  [id number]  :                       1955  ADMIT DATE:       2022 4:16 PM  [address] [place] DATE:  RESPONDING  PROVIDER #:        Soila TORERS          QUERY TEXT:    Dear Hospitalist  Pt admitted with Shingles. Pt noted to have temp  101.3   with  WBC  12.5  and lactic  acid  2.4 on admission. If possible, please document in the progress notes and discharge summary if you are evaluating and /or treating any of the following: The medical record reflects the following:  Risk Factors: failed outpt  treatment  Clinical Indicators:   temp  101.3   with  WBC  12.5  and lactic  acid  2.4 on admission;  WBc  up  to  14.5  on    Treatment: - IC  acyclovir   IV  q8h    Thank you,   [email or web address]  Options provided:  -- Sepsis, present on admission  -- Sepsis, present on admission now resolved  -- Sepsis,  confirmed,  not present on admission  -- Shingles  without Sepsis  -- Sepsis was ruled out  -- Other - I will add my own diagnosis  -- Disagree - Not applicable / Not valid  -- Disagree - Clinically unable to determine / Unknown  -- Refer to Clinical Documentation Reviewer    PROVIDER RESPONSE TEXT:    After further study, sepsis was ruled out for this patient. Query created by: Jose Mays on 2022 10:30 AM      QUERY TEXT:    Dear Hospitalist  Pt admitted with Shingles. Pt noted to have Lactic  acid  level  2.4  on  admit. If possible, please document in the progress notes and discharge summary if you are evaluating and/or treating any of the following: The medical record reflects the following:  Risk Factors: shingles  outbreak ; failed outpt treatment  Clinical Indicators: per  H&P- admission for a diagnosis of shingles outbreak with elevated lactic acid. (  max  level  3.0 )  Treatment: IVF  NS   at  75  cc/hr   stopped on  .     Thank you, [email or web address]  Options provided:  -- Lactic Acidosis  -- Other - I will add my own diagnosis  -- Disagree - Not applicable / Not valid  -- Disagree - Clinically unable to determine / Unknown  -- Refer to Clinical Documentation Reviewer    PROVIDER RESPONSE TEXT:    This patient has lactic acidosis.     Query created by: Karyna Keller on 5/20/2022 10:40 AM      Electronically signed by:  Suha Hernandez [facility] 5/21/2022 10:53 AM

## 2022-05-21 NOTE — PROGRESS NOTES
Pt discharge instructions says that pt is to continue toradol, however no RX was sent. System appears to show a RX was previously sent. Clarified with NP on medications and she reports that pt can continue home RX of tramadol PRN instead. Pt did not return call.

## 2022-05-21 NOTE — DISCHARGE SUMMARY
Discharge Summary     Patient: Mak Wan MRN: [id number]  SSN: [id number]    YOB: 1955  Age: 74 y.o. Sex: male       Admit Date: 5/17/2022    Discharge Date: 5/21/2022      Admission Diagnoses: Sepsis ([facility] [A41.9]; Herpes zoster [B02.9]; Headache [R51.9]; Hypertension [I10]    Discharge Diagnoses:   Problem List as of 5/21/2022 Date Reviewed: 12/16/2021          Codes Class Noted - Resolved    Sepsis ([facility] ICD-10-CM: A41.9  ICD-9-CM: 038.9, 995.91  5/19/2022 - Present        Herpes zoster ICD-10-CM: B02.9  ICD-9-CM: 053.9  5/17/2022 - Present        Hypertension ICD-10-CM: I10  ICD-9-CM: 401.9  5/17/2022 - Present        Headache ICD-10-CM: R51.9  ICD-9-CM: 784.0  5/17/2022 - Present        Impotence due to erectile dysfunction ICD-10-CM: N52.9  ICD-9-CM: 607.84  8/4/2020 - Present        RESOLVED: Sepsis ([facility] ICD-10-CM: A41.9  ICD-9-CM: 038.9, 995.91  5/17/2022 - 5/17/2022               Discharge Condition: Good    Hospital Course: Mak Wan is a 74 y.o. male with a past medical history for hypertension and hyperlipidemia, patient presents to the ED with a chief complaint of a rash, headache, and fever. Patient reports that the symptoms started on Friday, he states he noticed that the rash was on his torso area, but on Saturday the rash began to spread to his back and the pain intensified. Other accompanying symptoms included a headache located across the his frontal region of his head, fever and chills. Patient denies chest pain, shortness of breath, nausea, vomiting, diarrhea, abdominal pain. Patient reports that he was able to see his PCP on Monday and he was started on antiviral medication Famciclovir, prednisone, and tramadol for pain, he reports that he started taking the antiviral and the prednisone as soon as he received the medication, but his symptoms worsen, and his PCP told him to come to the ED.  When the patient first arrived to the ED his temperature was 101.9, lactic acid 2.4, blood cultures was drawn, rapid COVID-negative, CT of the head show no significant abnormality, chest x-ray showed no acute cardiopulmonary process, and EKG showed sinus rhythm. While in the ED patient did receive 1 L of IV fluids, 1 g of IV Rocephin, 1000 mg of oral Tylenol, 500 mg of IV acyclovir, 125 mg of IV Solu-Medrol, and 1 mg of IV Dilaudid.      Admit to telemetry for observation. Patient assessed in the ED, at the bedside, patient is alert and oriented, there is no acute distress noted. Patient agrees to admission for a diagnosis of shingles outbreak with elevated lactic acid, treatment to include IV hydration, continue oral acyclovir, and oral prednisone. Airborne isolation implemented. During his hospitalization, he was put on IV acyclovir. Lumbar puncture was done and CSF pathogen panel was positive for VZV. CSF WBC at 28, CSF glucose at 67, CSF protein at 58, no WBCs found on CSF culture. 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 Strongly advised patient to receive shingles vaccine. Physical exam:    Visit Vitals  BP (!) 163/81   Pulse (!) 57   Temp 98.1 °F (36.7 °C)   Resp 18   Ht 6' 2\" (1.88 m)   Wt 101.4 kg (223 lb 8 oz)   SpO2 96%   BMI 28.70 kg/m²     Gen: NAD  Chest: RRR, no murmurs  Lungs: CTAB, no wheezing  Abdomen: S/NT/ND/normoactive bowel sounds  Ext: No edema  Skin: Erythematous rash to from right side of back to right torso    Consults: Anesthesia    Significant Diagnostic Studies:   CSF pathogen panel - Positive for VZV    Disposition: home    Discharge Medications:   Current Discharge Medication List      START taking these medications    Details   acyclovir (ZOVIRAX) 800 mg tablet Take 1 Tablet by mouth three (3) times daily for 11 days.   Qty: 33 Tablet, Refills: 0         CONTINUE these medications which have NOT CHANGED    Details   traMADoL (ULTRAM) 50 mg tablet every eight (8) hours as needed for Pain.      simvastatin (ZOCOR) 40 mg tablet Take 1 Tablet by mouth nightly. Qty: 90 Tablet, Refills: 3      metoprolol succinate (TOPROL-XL) 50 mg XL tablet Take 1 Tablet by mouth daily. Qty: 90 Tablet, Refills: 3      losartan-hydroCHLOROthiazide (HYZAAR) 100-12.5 mg per tablet Take 1 Tablet by mouth daily. Qty: 90 Tablet, Refills: 3      ketorolac (TORADOL) 10 mg tablet Take 1 Tab by mouth every six (6) hours as needed for Pain. Qty: 20 Tab, Refills: 0      sildenafil citrate (VIAGRA) 100 mg tablet 0.5 or 1 daily as needed  Indications: the inability to have an erection  Qty: 6 Tablet, Refills: 5         STOP taking these medications       predniSONE (DELTASONE) 10 mg tablet Comments:   Reason for Stopping:         famciclovir (FAMVIR) 500 mg tablet Comments:   Reason for Stopping:               Activity: Activity as tolerated  Diet: Regular Diet  Wound Care: None needed    More than 30 minutes was spent on discharge    Follow-up Appointments   Procedures    FOLLOW UP VISIT Appointment in: Two Weeks     Standing Status:   Standing     Number of Occurrences:   1     Order Specific Question:   Appointment in     Answer:    Two Weeks       Signed By: Monserrat Shaikh MD     May 21, 2022

## 2022-05-23 LAB
BACTERIA SPEC CULT: NORMAL
BACTERIA SPEC CULT: NORMAL
SPECIAL REQUESTS,SREQ: NORMAL
SPECIAL REQUESTS,SREQ: NORMAL
VZV IGG SER IA-ACNC: >4000 INDEX
VZV IGM SER IA-ACNC: 1.24 INDEX (ref 0–0.9)

## 2022-05-26 LAB
BACTERIA SPEC CULT: NORMAL
GRAM STN SPEC: NORMAL
GRAM STN SPEC: NORMAL
SPECIAL REQUESTS,SREQ: NORMAL

## 2022-09-26 ENCOUNTER — TRANSCRIBE ORDER (OUTPATIENT)
Dept: SCHEDULING | Age: 67
End: 2022-09-26

## 2022-09-26 DIAGNOSIS — G45.9 TIA (TRANSIENT ISCHEMIC ATTACK): ICD-10-CM

## 2022-09-26 DIAGNOSIS — R06.09 DOE (DYSPNEA ON EXERTION): Primary | ICD-10-CM

## 2022-10-25 NOTE — ASSESSMENT & PLAN NOTE
-Chronic/mildly elevated  -continue Hyzaar and Toprol  -monitor BP closely Accession # (Optional): [id number]

## 2022-11-02 ENCOUNTER — TRANSCRIBE ORDER (OUTPATIENT)
Dept: SCHEDULING | Age: 67
End: 2022-11-02

## 2022-11-03 ENCOUNTER — APPOINTMENT (OUTPATIENT)
Dept: NON INVASIVE DIAGNOSTICS | Age: 67
End: 2022-11-03
Attending: INTERNAL MEDICINE
Payer: MEDICARE

## 2022-11-03 ENCOUNTER — APPOINTMENT (OUTPATIENT)
Dept: NUCLEAR MEDICINE | Age: 67
End: 2022-11-03
Attending: INTERNAL MEDICINE
Payer: MEDICARE

## 2022-11-04 ENCOUNTER — HOSPITAL ENCOUNTER (OUTPATIENT)
Dept: NON INVASIVE DIAGNOSTICS | Age: 67
Discharge: HOME | End: 2022-11-04
Attending: INTERNAL MEDICINE
Payer: MEDICARE

## 2022-11-04 ENCOUNTER — HOSPITAL ENCOUNTER (OUTPATIENT)
Dept: VASCULAR SURGERY | Age: 67
Discharge: HOME | End: 2022-11-04
Attending: INTERNAL MEDICINE
Payer: MEDICARE

## 2022-11-04 ENCOUNTER — APPOINTMENT (OUTPATIENT)
Dept: NUCLEAR MEDICINE | Age: 67
End: 2022-11-04
Attending: INTERNAL MEDICINE
Payer: MEDICARE

## 2022-11-04 ENCOUNTER — HOSPITAL ENCOUNTER (OUTPATIENT)
Dept: NUCLEAR MEDICINE | Age: 67
End: 2022-11-04
Attending: INTERNAL MEDICINE
Payer: MEDICARE

## 2022-11-04 ENCOUNTER — APPOINTMENT (OUTPATIENT)
Dept: NON INVASIVE DIAGNOSTICS | Age: 67
End: 2022-11-04
Attending: INTERNAL MEDICINE
Payer: MEDICARE

## 2022-11-04 VITALS
DIASTOLIC BLOOD PRESSURE: 79 MMHG | HEIGHT: 74 IN | WEIGHT: 240 LBS | BODY MASS INDEX: 30.8 KG/M2 | SYSTOLIC BLOOD PRESSURE: 140 MMHG

## 2022-11-04 DIAGNOSIS — G45.9 TIA (TRANSIENT ISCHEMIC ATTACK): ICD-10-CM

## 2022-11-04 DIAGNOSIS — R06.09 DOE (DYSPNEA ON EXERTION): ICD-10-CM

## 2022-11-04 LAB
ECHO AO ASC DIAM: 3.3 CM
ECHO AO ASCENDING AORTA INDEX: 1.4 CM/M2
ECHO AO ROOT DIAM: 3.2 CM
ECHO AO ROOT INDEX: 1.36 CM/M2
ECHO AR MAX VEL PISA: 3.2 M/S
ECHO AV AREA PEAK VELOCITY: 3.5 CM2
ECHO AV AREA VTI: 3.6 CM2
ECHO AV AREA/BSA PEAK VELOCITY: 1.5 CM2/M2
ECHO AV AREA/BSA VTI: 1.5 CM2/M2
ECHO AV MEAN GRADIENT: 3 MMHG
ECHO AV MEAN VELOCITY: 0.8 M/S
ECHO AV PEAK GRADIENT: 5 MMHG
ECHO AV PEAK VELOCITY: 1.1 M/S
ECHO AV REGURGITANT PHT: 876 MS
ECHO AV VELOCITY RATIO: 1.09
ECHO AV VTI: 26.1 CM
ECHO EST RA PRESSURE: 3 MMHG
ECHO IVC PROX: 1.7 CM
ECHO LA AREA 2C: 23.9 CM2
ECHO LA AREA 4C: 23.4 CM2
ECHO LA DIAMETER INDEX: 1.91 CM/M2
ECHO LA DIAMETER: 4.5 CM
ECHO LA MAJOR AXIS: 6.8 CM
ECHO LA MINOR AXIS: 6.8 CM
ECHO LA TO AORTIC ROOT RATIO: 1.41
ECHO LA VOL BP: 67 ML (ref 18–58)
ECHO LA VOL/BSA BIPLANE: 29 ML/M2 (ref 16–34)
ECHO LV E' LATERAL VELOCITY: 9 CM/S
ECHO LV E' SEPTAL VELOCITY: 9 CM/S
ECHO LV EDV A2C: 59 ML
ECHO LV EDV A4C: 56 ML
ECHO LV EDV INDEX A4C: 24 ML/M2
ECHO LV EDV NDEX A2C: 25 ML/M2
ECHO LV EJECTION FRACTION A2C: 69 %
ECHO LV EJECTION FRACTION A4C: 67 %
ECHO LV EJECTION FRACTION BIPLANE: 66 % (ref 55–100)
ECHO LV ESV A2C: 18 ML
ECHO LV ESV A4C: 19 ML
ECHO LV ESV INDEX A2C: 8 ML/M2
ECHO LV ESV INDEX A4C: 8 ML/M2
ECHO LV FRACTIONAL SHORTENING: 35 % (ref 28–44)
ECHO LV INTERNAL DIMENSION DIASTOLE INDEX: 2.09 CM/M2
ECHO LV INTERNAL DIMENSION DIASTOLIC: 4.9 CM (ref 4.2–5.9)
ECHO LV INTERNAL DIMENSION SYSTOLIC INDEX: 1.36 CM/M2
ECHO LV INTERNAL DIMENSION SYSTOLIC: 3.2 CM
ECHO LV IVSD: 1.1 CM (ref 0.6–1)
ECHO LV MASS 2D: 213.3 G (ref 88–224)
ECHO LV MASS INDEX 2D: 90.7 G/M2 (ref 49–115)
ECHO LV POSTERIOR WALL DIASTOLIC: 1.2 CM (ref 0.6–1)
ECHO LV RELATIVE WALL THICKNESS RATIO: 0.49
ECHO LVOT AREA: 3.5 CM2
ECHO LVOT AV VTI INDEX: 1.05
ECHO LVOT DIAM: 2.1 CM
ECHO LVOT MEAN GRADIENT: 3 MMHG
ECHO LVOT PEAK GRADIENT: 5 MMHG
ECHO LVOT PEAK VELOCITY: 1.2 M/S
ECHO LVOT STROKE VOLUME INDEX: 40.4 ML/M2
ECHO LVOT SV: 94.9 ML
ECHO LVOT VTI: 27.4 CM
ECHO MV A VELOCITY: 0.56 M/S
ECHO MV AREA VTI: 3.1 CM2
ECHO MV E DECELERATION TIME (DT): 265 MS
ECHO MV E VELOCITY: 0.65 M/S
ECHO MV E/A RATIO: 1.16
ECHO MV E/E' LATERAL: 7.22
ECHO MV E/E' RATIO (AVERAGED): 7.22
ECHO MV E/E' SEPTAL: 7.22
ECHO MV LVOT VTI INDEX: 1.11
ECHO MV MAX VELOCITY: 0.9 M/S
ECHO MV MEAN GRADIENT: 1 MMHG
ECHO MV MEAN VELOCITY: 0.5 M/S
ECHO MV PEAK GRADIENT: 3 MMHG
ECHO MV VTI: 30.4 CM
ECHO PV MAX VELOCITY: 1 M/S
ECHO PV PEAK GRADIENT: 4 MMHG
ECHO RA AREA 4C: 19.6 CM2
ECHO RA END SYSTOLIC VOLUME APICAL 4 CHAMBER INDEX BSA: 24 ML/M2
ECHO RA VOLUME: 56 ML
ECHO RIGHT VENTRICULAR SYSTOLIC PRESSURE (RVSP): 21 MMHG
ECHO RV BASAL DIMENSION: 4 CM
ECHO RV LONGITUDINAL DIMENSION: 6.4 CM
ECHO RV MID DIMENSION: 2.9 CM
ECHO RV TAPSE: 2.3 CM (ref 1.7–?)
ECHO TV REGURGITANT MAX VELOCITY: 2.11 M/S
ECHO TV REGURGITANT PEAK GRADIENT: 18 MMHG
LEFT CCA DIST DIAS: 32.9 CM/S
LEFT CCA DIST SYS: 113 CM/S
LEFT CCA MID DIAS: 28.2 CM/S
LEFT CCA MID SYS: 131 CM/S
LEFT CCA PROX DIAS: 28.7 CM/S
LEFT CCA PROX SYS: 125 CM/S
LEFT ECA SYS: 139 CM/S
LEFT ICA DIST DIAS: 36.4 CM/S
LEFT ICA DIST SYS: 117 CM/S
LEFT ICA MID DIAS: 39.9 CM/S
LEFT ICA MID SYS: 123 CM/S
LEFT ICA PROX DIAS: 30.1 CM/S
LEFT ICA PROX SYS: 98.8 CM/S
LEFT ICA/CCA SYS: 1.09
LEFT VERTEBRAL DIAS: 11 CM/S
LEFT VERTEBRAL SYS: 48.3 CM/S
RIGHT CCA DIST DIAS: 16.8 CM/S
RIGHT CCA DIST SYS: 89.7 CM/S
RIGHT CCA MID DIAS: 22.9 CM/S
RIGHT CCA MID SYS: 98.1 CM/S
RIGHT CCA PROX DIAS: 17.3 CM/S
RIGHT CCA PROX SYS: 71.8 CM/S
RIGHT ECA SYS: 98.4 CM/S
RIGHT ICA DIST DIAS: 33.3 CM/S
RIGHT ICA DIST SYS: 108 CM/S
RIGHT ICA MID DIAS: 50.3 CM/S
RIGHT ICA MID SYS: 121 CM/S
RIGHT ICA PROX DIAS: 26.6 CM/S
RIGHT ICA PROX SYS: 86.2 CM/S
RIGHT ICA/CCA SYS: 1.34
RIGHT VERTEBRAL DIAS: 19.3 CM/S
RIGHT VERTEBRAL SYS: 68.3 CM/S
VAS LEFT SUBCLAVIAN MID PSV: 178 CM/S
VAS RIGHT SUBCLAVIAN MID PSV: 126 CM/S

## 2022-11-04 PROCEDURE — 93306 TTE W/DOPPLER COMPLETE: CPT

## 2022-11-04 PROCEDURE — 93880 EXTRACRANIAL BILAT STUDY: CPT

## 2022-11-07 ENCOUNTER — HOSPITAL ENCOUNTER (OUTPATIENT)
Dept: NUCLEAR MEDICINE | Age: 67
Discharge: HOME | End: 2022-11-07
Attending: INTERNAL MEDICINE
Payer: MEDICARE

## 2022-11-07 ENCOUNTER — HOSPITAL ENCOUNTER (OUTPATIENT)
Dept: NON INVASIVE DIAGNOSTICS | Age: 67
Discharge: HOME | End: 2022-11-07
Attending: INTERNAL MEDICINE
Payer: MEDICARE

## 2022-11-07 VITALS
DIASTOLIC BLOOD PRESSURE: 61 MMHG | HEART RATE: 75 BPM | SYSTOLIC BLOOD PRESSURE: 118 MMHG | HEIGHT: 74 IN | BODY MASS INDEX: 30.8 KG/M2 | WEIGHT: 240 LBS

## 2022-11-07 DIAGNOSIS — R06.09 DOE (DYSPNEA ON EXERTION): ICD-10-CM

## 2022-11-07 LAB
NUC STRESS EJECTION FRACTION: 58 %
STRESS ANGINA INDEX: 0
STRESS BASELINE HR: 57 BPM
STRESS BASELINE ST DEPRESSION: 0 MM
STRESS ESTIMATED WORKLOAD: 8.1 METS
STRESS EXERCISE DUR MIN: 6 MIN
STRESS EXERCISE DUR SEC: 23 SEC
STRESS PEAK DIAS BP: 81 MMHG
STRESS PEAK SYS BP: 240 MMHG
STRESS PERCENT HR ACHIEVED: 97 %
STRESS POST PEAK HR: 148 BPM
STRESS RATE PRESSURE PRODUCT: NORMAL BPM*MMHG
STRESS TARGET HR: 152 BPM

## 2022-11-07 PROCEDURE — A9500 TC99M SESTAMIBI: HCPCS

## 2022-11-07 PROCEDURE — 93017 CV STRESS TEST TRACING ONLY: CPT

## 2022-11-07 RX ORDER — TETRAKIS(2-METHOXYISOBUTYLISOCYANIDE)COPPER(I) TETRAFLUOROBORATE 1 MG/ML
34 INJECTION, POWDER, LYOPHILIZED, FOR SOLUTION INTRAVENOUS
Status: COMPLETED | OUTPATIENT
Start: 2022-11-07 | End: 2022-11-07

## 2022-11-07 RX ORDER — TETRAKIS(2-METHOXYISOBUTYLISOCYANIDE)COPPER(I) TETRAFLUOROBORATE 1 MG/ML
9.65 INJECTION, POWDER, LYOPHILIZED, FOR SOLUTION INTRAVENOUS
Status: COMPLETED | OUTPATIENT
Start: 2022-11-07 | End: 2022-11-07

## 2022-11-07 RX ADMIN — TETRAKIS(2-METHOXYISOBUTYLISOCYANIDE)COPPER(I) TETRAFLUOROBORATE 9.65 MILLICURIE: 1 INJECTION, POWDER, LYOPHILIZED, FOR SOLUTION INTRAVENOUS at 07:21

## 2022-11-07 RX ADMIN — TETRAKIS(2-METHOXYISOBUTYLISOCYANIDE)COPPER(I) TETRAFLUOROBORATE 34 MILLICURIE: 1 INJECTION, POWDER, LYOPHILIZED, FOR SOLUTION INTRAVENOUS at 09:00

## 2023-05-31 ENCOUNTER — HOSPITAL ENCOUNTER (EMERGENCY)
Facility: HOSPITAL | Age: 68
Discharge: HOME | End: 2023-05-31
Attending: EMERGENCY MEDICINE
Payer: MEDICARE

## 2023-05-31 ENCOUNTER — APPOINTMENT (OUTPATIENT)
Facility: HOSPITAL | Age: 68
End: 2023-05-31
Payer: MEDICARE

## 2023-05-31 VITALS
DIASTOLIC BLOOD PRESSURE: 70 MMHG | HEART RATE: 79 BPM | HEIGHT: 74 IN | RESPIRATION RATE: 18 BRPM | BODY MASS INDEX: 30.8 KG/M2 | OXYGEN SATURATION: 97 % | TEMPERATURE: 98.6 F | WEIGHT: 240 LBS | SYSTOLIC BLOOD PRESSURE: 160 MMHG

## 2023-05-31 DIAGNOSIS — J34.89 SINUS DRAINAGE: Primary | ICD-10-CM

## 2023-05-31 DIAGNOSIS — R05.1 ACUTE COUGH: ICD-10-CM

## 2023-05-31 DIAGNOSIS — J30.2 SEASONAL ALLERGIES: ICD-10-CM

## 2023-05-31 DIAGNOSIS — R06.2 WHEEZING: ICD-10-CM

## 2023-05-31 PROCEDURE — 71046 X-RAY EXAM CHEST 2 VIEWS: CPT

## 2023-05-31 PROCEDURE — 93005 ELECTROCARDIOGRAM TRACING: CPT | Performed by: EMERGENCY MEDICINE

## 2023-05-31 PROCEDURE — 99284 EMERGENCY DEPT VISIT MOD MDM: CPT

## 2023-05-31 RX ORDER — FLUTICASONE PROPIONATE 50 MCG
2 SPRAY, SUSPENSION (ML) NASAL DAILY
Qty: 1 EACH | Refills: 0 | Status: SHIPPED | OUTPATIENT
Start: 2023-05-31

## 2023-05-31 RX ORDER — ALBUTEROL SULFATE 90 UG/1
2 AEROSOL, METERED RESPIRATORY (INHALATION) EVERY 4 HOURS PRN
Qty: 1 EACH | Refills: 1 | Status: SHIPPED | OUTPATIENT
Start: 2023-05-31

## 2023-05-31 RX ORDER — MONTELUKAST SODIUM 10 MG/1
10 TABLET ORAL NIGHTLY
Qty: 30 TABLET | Refills: 0 | Status: SHIPPED | OUTPATIENT
Start: 2023-05-31

## 2023-05-31 RX ORDER — GUAIFENESIN AND CODEINE PHOSPHATE 100; 10 MG/5ML; MG/5ML
10 SOLUTION ORAL
Qty: 50 ML | Refills: 0 | Status: SHIPPED | OUTPATIENT
Start: 2023-05-31 | End: 2023-06-05

## 2023-05-31 NOTE — ED PROVIDER NOTES
[facility] EMERGENCY DEPT  EMERGENCY DEPARTMENT HISTORY AND PHYSICAL EXAM      Date: 5/31/2023  Patient Name: Milton Kumar  MRN: [id number]  [place]: 1955  Date of evaluation: 5/31/2023  Provider: Nolvia Lucas MD   Note Started: 11:29 AM EDT 5/31/23    HISTORY OF PRESENT ILLNESS     Chief Complaint   Patient presents with    Shortness of Breath       History Provided By: Patient    HPI: Milton Kumar is a 69 y.o. male with HTN began coughing yesterday with paroxysms. He went to urgent care this AM and had 02 sats in the low 90s. He recevied two breathingt treatments and feels much better and his 02 sats improved to upper 90s. He was sent here for evaluation. PAST MEDICAL HISTORY   Past Medical History:  Past Medical History:   Diagnosis Date    High blood pressure        Past Surgical History:  Past Surgical History:   Procedure Laterality Date    APPENDECTOMY      age 7    HEENT Bilateral     Lens Implant    REFRACTIVE SURGERY         Family History:  History reviewed. No pertinent family history. Social History:  Social History     Tobacco Use    Smoking status: Never    Smokeless tobacco: Never   Substance Use Topics    Alcohol use: Never    Drug use: Never       Allergies: Allergies   Allergen Reactions    Aurum Metallicum [Gold]        PCP: Connie Boggs MD    Current Meds:   No current facility-administered medications for this encounter. Current Outpatient Medications   Medication Sig Dispense Refill    ketorolac (TORADOL) 10 MG tablet Take 10 mg by mouth every 6 hours as needed      losartan-hydroCHLOROthiazide (HYZAAR) 100-12.5 MG per tablet Take 1 tablet by mouth daily      metoprolol succinate (TOPROL XL) 50 MG extended release tablet Take 50 mg by mouth daily      sildenafil (VIAGRA) 100 MG tablet 0.5 or 1 daily as needed  Indications: the inability to have an erection      simvastatin (ZOCOR) 40 MG tablet Take 40 mg by mouth      traMADol (ULTRAM) 50 MG tablet every 8 hours as needed.

## 2023-05-31 NOTE — ED TRIAGE NOTES
Patient reports yesterday started with clear sinus drainage that progressed to a cough  patient was seen at [facility] this morning for same and they sent him to us for chest xray to rule out PNA patients oxygen sats at Dr office was 93% on room air  Patient receive 2 breathing treatments and steroid shot at [facility] this morning

## 2023-05-31 NOTE — DISCHARGE INSTRUCTIONS
Thank you! Thank you for allowing me to care for you in the emergency department. It is my goal to provide you with excellent care. If you have not received excellent quality care, please ask to speak to the nurse manager. Please fill out the survey that will come to you by mail or email since we listen to your feedback! Below you will find a list of your tests from today's visit. Should you have any questions, please do not hesitate to call the emergency department. Labs  Recent Results (from the past 12 hour(s))   EKG 12 Lead    Collection Time: 05/31/23 11:15 AM   Result Value Ref Range    Ventricular Rate 78 BPM    Atrial Rate 78 BPM    P-R Interval 184 ms    QRS Duration 101 ms    Q-T Interval 379 ms    QTc Calculation (Bazett) 432 ms    P Axis 43 degrees    R Axis 82 degrees    T Axis -50 degrees    Diagnosis       Sinus rhythm  Borderline right axis deviation  Borderline T abnormalities, inferior leads         Radiologic Studies  XR CHEST (2 VW)   Final Result   Mild left basilar atelectasis.            ------------------------------------------------------------------------------------------------------------  The exam and treatment you received in the Emergency Department were for an urgent problem and are not intended as complete care. It is important that you follow-up with a doctor, nurse practitioner, or physician assistant to:  (1) confirm your diagnosis,  (2) re-evaluation of changes in your illness and treatment, and  (3) for ongoing care. Please take your discharge instructions with you when you go to your follow-up appointment. If you have any problem arranging a follow-up appointment, contact the Emergency Department. If your symptoms become worse or you do not improve as expected and you are unable to reach your health care provider, please return to the Emergency Department. We are available 24 hours a day.      If a prescription has been provided, please have it filled as soon

## 2023-06-01 LAB
EKG ATRIAL RATE: 78 BPM
EKG DIAGNOSIS: NORMAL
EKG P AXIS: 43 DEGREES
EKG P-R INTERVAL: 184 MS
EKG Q-T INTERVAL: 379 MS
EKG QRS DURATION: 101 MS
EKG QTC CALCULATION (BAZETT): 432 MS
EKG R AXIS: 82 DEGREES
EKG T AXIS: -50 DEGREES
EKG VENTRICULAR RATE: 78 BPM

## 2024-03-13 ENCOUNTER — HOSPITAL ENCOUNTER (OUTPATIENT)
Age: 69
Discharge: HOME | End: 2024-03-15
Attending: INTERNAL MEDICINE
Payer: MEDICARE

## 2024-03-13 VITALS
SYSTOLIC BLOOD PRESSURE: 169 MMHG | DIASTOLIC BLOOD PRESSURE: 83 MMHG | WEIGHT: 240 LBS | BODY MASS INDEX: 30.8 KG/M2 | HEIGHT: 74 IN

## 2024-03-13 DIAGNOSIS — I42.2 HYPERTROPHIC CARDIOMYOPATHY (HCC): ICD-10-CM

## 2024-03-13 LAB
ECHO AO ASC DIAM: 3.4 CM
ECHO AO ASCENDING AORTA INDEX: 1.45 CM/M2
ECHO AO ROOT DIAM: 3.5 CM
ECHO AO ROOT INDEX: 1.49 CM/M2
ECHO AR MAX VEL PISA: 2.7 M/S
ECHO AV AREA PEAK VELOCITY: 3 CM2
ECHO AV AREA VTI: 3.1 CM2
ECHO AV AREA/BSA PEAK VELOCITY: 1.3 CM2/M2
ECHO AV AREA/BSA VTI: 1.3 CM2/M2
ECHO AV MEAN GRADIENT: 3 MMHG
ECHO AV MEAN VELOCITY: 0.7 M/S
ECHO AV PEAK GRADIENT: 5 MMHG
ECHO AV PEAK VELOCITY: 1.1 M/S
ECHO AV REGURGITANT PHT: 1253.3 MILLISECOND
ECHO AV VELOCITY RATIO: 0.55
ECHO AV VTI: 27 CM
ECHO BSA: 2.38 M2
ECHO EST RA PRESSURE: 3 MMHG
ECHO LA DIAMETER INDEX: 1.83 CM/M2
ECHO LA DIAMETER: 4.3 CM
ECHO LA TO AORTIC ROOT RATIO: 1.23
ECHO LA VOL A-L A2C: 64 ML (ref 18–58)
ECHO LA VOL A-L A4C: 68 ML (ref 18–58)
ECHO LA VOL BP: 59 ML (ref 18–58)
ECHO LA VOL MOD A2C: 62 ML (ref 18–58)
ECHO LA VOL MOD A4C: 56 ML (ref 18–58)
ECHO LA VOL/BSA BIPLANE: 25 ML/M2 (ref 16–34)
ECHO LA VOLUME AREA LENGTH: 66 ML
ECHO LA VOLUME INDEX A-L A2C: 27 ML/M2 (ref 16–34)
ECHO LA VOLUME INDEX A-L A4C: 29 ML/M2 (ref 16–34)
ECHO LA VOLUME INDEX AREA LENGTH: 28 ML/M2 (ref 16–34)
ECHO LA VOLUME INDEX MOD A2C: 26 ML/M2 (ref 16–34)
ECHO LA VOLUME INDEX MOD A4C: 24 ML/M2 (ref 16–34)
ECHO LV E' LATERAL VELOCITY: 7 CM/S
ECHO LV E' SEPTAL VELOCITY: 9 CM/S
ECHO LV EJECTION FRACTION A2C: 63 %
ECHO LV EJECTION FRACTION A4C: 63 %
ECHO LV EJECTION FRACTION BIPLANE: 66 % (ref 55–100)
ECHO LV FRACTIONAL SHORTENING: 30 % (ref 28–44)
ECHO LV GLOBAL LONGITUDINAL STRAIN (GLS): -17.3 %
ECHO LV INTERNAL DIMENSION DIASTOLE INDEX: 1.96 CM/M2
ECHO LV INTERNAL DIMENSION DIASTOLIC: 4.6 CM (ref 4.2–5.9)
ECHO LV INTERNAL DIMENSION SYSTOLIC INDEX: 1.36 CM/M2
ECHO LV INTERNAL DIMENSION SYSTOLIC: 3.2 CM
ECHO LV IVSD: 1.3 CM (ref 0.6–1)
ECHO LV MASS 2D: 230.2 G (ref 88–224)
ECHO LV MASS INDEX 2D: 97.9 G/M2 (ref 49–115)
ECHO LV POSTERIOR WALL DIASTOLIC: 1.3 CM (ref 0.6–1)
ECHO LV RELATIVE WALL THICKNESS RATIO: 0.57
ECHO LVOT AREA: 5.3 CM2
ECHO LVOT AV VTI INDEX: 0.58
ECHO LVOT DIAM: 2.6 CM
ECHO LVOT MEAN GRADIENT: 1 MMHG
ECHO LVOT PEAK GRADIENT: 2 MMHG
ECHO LVOT PEAK VELOCITY: 0.6 M/S
ECHO LVOT STROKE VOLUME INDEX: 35.5 ML/M2
ECHO LVOT SV: 83.3 ML
ECHO LVOT VTI: 15.7 CM
ECHO MV A VELOCITY: 0.68 M/S
ECHO MV AREA VTI: 4.7 CM2
ECHO MV E DECELERATION TIME (DT): 185.4 MS
ECHO MV E VELOCITY: 0.83 M/S
ECHO MV E/A RATIO: 1.22
ECHO MV E/E' LATERAL: 11.86
ECHO MV E/E' RATIO (AVERAGED): 10.54
ECHO MV LVOT VTI INDEX: 1.12
ECHO MV MAX VELOCITY: 0.8 M/S
ECHO MV MEAN GRADIENT: 0 MMHG
ECHO MV MEAN VELOCITY: 0.2 M/S
ECHO MV PEAK GRADIENT: 2 MMHG
ECHO MV VTI: 17.6 CM
ECHO PV MAX VELOCITY: 1 M/S
ECHO PV MEAN GRADIENT: 2 MMHG
ECHO PV MEAN VELOCITY: 0.7 M/S
ECHO PV PEAK GRADIENT: 4 MMHG
ECHO RA END SYSTOLIC VOLUME APICAL 4 CHAMBER INDEX BSA: 16 ML/M2
ECHO RA VOLUME BIPLANE METHOD OF DISKS: 39 ML
ECHO RA VOLUME INDEX BP: 17 ML/M2
ECHO RA VOLUME: 37 ML
ECHO RIGHT VENTRICULAR SYSTOLIC PRESSURE (RVSP): 28 MMHG
ECHO RV TAPSE: 2.3 CM (ref 1.7–?)
ECHO TV REGURGITANT MAX VELOCITY: 2.5 M/S
ECHO TV REGURGITANT PEAK GRADIENT: 25 MMHG

## 2024-03-13 PROCEDURE — 93306 TTE W/DOPPLER COMPLETE: CPT

## 2024-04-10 ENCOUNTER — OFFICE VISIT (OUTPATIENT)
Age: 69
End: 2024-04-10

## 2024-04-10 VITALS — BODY MASS INDEX: 30.8 KG/M2 | WEIGHT: 240 LBS | HEIGHT: 74 IN

## 2024-04-10 DIAGNOSIS — M20.012 MALLET DEFORMITY OF LEFT MIDDLE FINGER: Primary | ICD-10-CM

## 2024-04-10 NOTE — PROGRESS NOTES
Russell Lan is a 68 y.o. male right handed retiree.  Worker's Compensation and legal considerations: none    Chief Complaint   Patient presents with    Finger Pain     Left middle finger      Pain Score:   0 - No pain    HPI: Patient presents today with a history of an injury to his left middle finger that caused the end of the finger to be bent.  He reports it has been improving gradually and only occasional pain.    Date of onset: February 2024  Injury: Yes: Comment: Left middle finger  Prior Treatment:  Yes: Comment: Splint only for a few days.    ROS: Review of Systems - General ROS: negative except HPI    Past Medical History:   Diagnosis Date    High blood pressure        Past Surgical History:   Procedure Laterality Date    APPENDECTOMY      age 8    HEENT Bilateral     Lens Implant    REFRACTIVE SURGERY          Current Outpatient Medications   Medication Sig Dispense Refill    losartan-hydroCHLOROthiazide (HYZAAR) 100-12.5 MG per tablet Take 1 tablet by mouth daily      metoprolol succinate (TOPROL XL) 50 MG extended release tablet Take 1 tablet by mouth daily      sildenafil (VIAGRA) 100 MG tablet 0.5 or 1 daily as needed  Indications: the inability to have an erection      simvastatin (ZOCOR) 40 MG tablet Take 1 tablet by mouth       No current facility-administered medications for this visit.       Allergies   Allergen Reactions    Aurum Metallicum [Gold]          Ht 1.88 m (6' 2\")   Wt 108.9 kg (240 lb)   BMI 30.81 kg/m²   Physical Exam  Constitutional:       General: He is not in acute distress.     Appearance: Normal appearance. He is not ill-appearing.   Cardiovascular:      Pulses: Normal pulses.   Pulmonary:      Effort: Pulmonary effort is normal. No respiratory distress.   Abdominal:      General: Abdomen is flat. There is no distension.   Musculoskeletal:         General: No swelling, tenderness, deformity or signs of injury.      Cervical back: Normal range of motion and neck supple.

## 2024-10-24 ENCOUNTER — HOSPITAL ENCOUNTER (OUTPATIENT)
Age: 69
Discharge: HOME | End: 2024-10-27
Attending: INTERNAL MEDICINE
Payer: MEDICARE

## 2024-10-24 ENCOUNTER — HOSPITAL ENCOUNTER (OUTPATIENT)
Age: 69
Setting detail: SPECIMEN
Discharge: HOME | End: 2024-10-27
Payer: MEDICARE

## 2024-10-24 DIAGNOSIS — K11.8 SUBMANDIBULAR GLAND MASS: ICD-10-CM

## 2024-10-24 LAB — CREAT SERPL-MCNC: 1.03 MG/DL (ref 0.6–1.3)

## 2024-10-24 PROCEDURE — 70491 CT SOFT TISSUE NECK W/DYE: CPT

## 2024-10-24 PROCEDURE — 6360000004 HC RX CONTRAST MEDICATION: Performed by: INTERNAL MEDICINE

## 2024-10-24 PROCEDURE — 36415 COLL VENOUS BLD VENIPUNCTURE: CPT

## 2024-10-24 PROCEDURE — 82565 ASSAY OF CREATININE: CPT

## 2024-10-24 RX ORDER — IOPAMIDOL 755 MG/ML
100 INJECTION, SOLUTION INTRAVASCULAR
Status: COMPLETED | OUTPATIENT
Start: 2024-10-24 | End: 2024-10-24

## 2024-10-24 RX ADMIN — IOPAMIDOL 100 ML: 755 INJECTION, SOLUTION INTRAVENOUS at 09:38

## 2024-11-08 ENCOUNTER — HOSPITAL ENCOUNTER (OUTPATIENT)
Age: 69
Discharge: HOME | End: 2024-11-11
Attending: INTERNAL MEDICINE
Payer: MEDICARE

## 2024-11-08 DIAGNOSIS — R59.0 CERVICAL LYMPHADENOPATHY: ICD-10-CM

## 2024-11-08 DIAGNOSIS — R22.1 NECK MASS: ICD-10-CM

## 2024-11-08 PROCEDURE — 71260 CT THORAX DX C+: CPT

## 2024-11-08 PROCEDURE — 6360000004 HC RX CONTRAST MEDICATION: Performed by: INTERNAL MEDICINE

## 2024-11-08 RX ORDER — IOPAMIDOL 755 MG/ML
100 INJECTION, SOLUTION INTRAVASCULAR
Status: COMPLETED | OUTPATIENT
Start: 2024-11-08 | End: 2024-11-08

## 2024-11-08 RX ADMIN — IOPAMIDOL 100 ML: 755 INJECTION, SOLUTION INTRAVENOUS at 08:48
